# Patient Record
Sex: FEMALE | Race: WHITE | NOT HISPANIC OR LATINO | Employment: FULL TIME | URBAN - METROPOLITAN AREA
[De-identification: names, ages, dates, MRNs, and addresses within clinical notes are randomized per-mention and may not be internally consistent; named-entity substitution may affect disease eponyms.]

---

## 2023-04-28 ENCOUNTER — TRANSCRIBE ORDERS (OUTPATIENT)
Dept: ADMINISTRATIVE | Facility: HOSPITAL | Age: 43
End: 2023-04-28
Payer: COMMERCIAL

## 2023-04-28 DIAGNOSIS — G89.29 CHRONIC PAIN OF RIGHT KNEE: Primary | ICD-10-CM

## 2023-04-28 DIAGNOSIS — M25.561 CHRONIC PAIN OF RIGHT KNEE: Primary | ICD-10-CM

## 2023-04-28 DIAGNOSIS — M79.671 PAIN OF RIGHT HEEL: ICD-10-CM

## 2023-05-25 ENCOUNTER — OFFICE VISIT (OUTPATIENT)
Dept: ORTHOPEDIC SURGERY | Facility: CLINIC | Age: 43
End: 2023-05-25
Payer: COMMERCIAL

## 2023-05-25 ENCOUNTER — PATIENT ROUNDING (BHMG ONLY) (OUTPATIENT)
Dept: ORTHOPEDIC SURGERY | Facility: CLINIC | Age: 43
End: 2023-05-25
Payer: COMMERCIAL

## 2023-05-25 VITALS
SYSTOLIC BLOOD PRESSURE: 132 MMHG | BODY MASS INDEX: 42.22 KG/M2 | DIASTOLIC BLOOD PRESSURE: 86 MMHG | HEIGHT: 67 IN | WEIGHT: 269 LBS

## 2023-05-25 DIAGNOSIS — M77.51 RETROCALCANEAL BURSITIS (BACK OF HEEL), RIGHT: ICD-10-CM

## 2023-05-25 DIAGNOSIS — M76.61 ACHILLES TENDINITIS OF RIGHT LOWER EXTREMITY: Primary | ICD-10-CM

## 2023-05-25 RX ORDER — ERGOCALCIFEROL 1.25 MG/1
CAPSULE ORAL
COMMUNITY
Start: 2023-05-05

## 2023-05-25 NOTE — PROGRESS NOTES
May 25, 2023    Hello, may I speak with Nargis Weir?    My name is Angella      I am  with [facility]  [address]  [place] [address].    Before we get started may I verify your date of birth? 1980    I am calling to officially welcome you to our practice and ask about your recent visit. Is this a good time to talk? No.  Patient was at work when I called her.  Welcomed her to our practice!      Thank you, and have a great day.

## 2023-05-25 NOTE — PROGRESS NOTES
[facility] Office Visit     Office Visit       Date: 05/25/2023   Patient Name: Nargis Weir  MRN: [id number]  YOB: 1980    Referring Physician: Janell Campos AP*     Chief Complaint:   Chief Complaint   Patient presents with   • Right Foot - Pain       Nargis Weir is a 43 y.o. female who presents with new problem of: right foot pain.  Onset: atraumatic and gradual in nature. The issue has been ongoing for 1 year(s). Pain is a 3/10 on the pain scale. Pain is described as aching. Associated symptoms include pain. The pain is worse with walking and climbing stairs; sitting improve the pain. Previous treatments have included: NSAIDS.    I have reviewed the following portions of the patient's history:History of Present Illness and review of systems.            Subjective   Review of Systems: Review of Systems   Constitutional: Negative for chills, fever, unexpected weight gain and unexpected weight loss.   HENT: Negative for congestion, postnasal drip and rhinorrhea.    Eyes: Negative for blurred vision.   Respiratory: Negative for shortness of breath.    Cardiovascular: Negative for leg swelling.   Gastrointestinal: Negative for abdominal pain, nausea and vomiting.   Genitourinary: Negative for difficulty urinating.   Musculoskeletal: Positive for arthralgias. Negative for gait problem, joint swelling and myalgias.   Skin: Negative for skin lesions and wound.   Neurological: Negative for dizziness, weakness, light-headedness and numbness.   Hematological: Does not bruise/bleed easily.   Psychiatric/Behavioral: Negative for depressed mood.        Past Medical History:   Past Medical History:   Diagnosis Date   • Hip arthrosis 2016       Past Surgical History: History reviewed. No pertinent surgical history.    Family History:   Family History   Problem Relation Age of Onset   • Cancer Mother         breast cancer   • Osteoporosis Paternal  "Grandmother        Social History:   Social History     Socioeconomic History   • Marital status:    Tobacco Use   • Smoking status: Former     Packs/day: 0.25     Years: 15.00     Pack years: 3.75     Types: Cigarettes     Start date: 1994     Quit date: 2022     Years since quittin.0   • Tobacco comments:     Quit for a period of several on one occasion and for a year on another occasion   Substance and Sexual Activity   • Alcohol use: Yes     Comment: I drink socially once every few months or so   • Drug use: Never   • Sexual activity: Yes     Partners: Male     Birth control/protection: Vasectomy     Comment:  had a vasectomy in        Medications:   Current Outpatient Medications:   •  Diclofenac Sodium (VOLTAREN) 1 % gel gel, , Disp: , Rfl:   •  vitamin D (ERGOCALCIFEROL) 1.25 MG (03267 UT) capsule capsule, , Disp: , Rfl:     Allergies: No Known Allergies    I reviewed the patient's chief complaint, history of present illness, review of systems, past medical history, surgical history, family history, social history, medications and allergy list.     Objective    Vital Signs:   Vitals:    23 0949   BP: 132/86   Weight: 122 kg (269 lb)   Height: 170.3 cm (67.05\")     Body mass index is 42.07 kg/m². Class 3 Severe Obesity (BMI >=40). Obesity-related health conditions include the following: hypertension, coronary heart disease, diabetes mellitus and dyslipidemias. Obesity is newly identified. BMI is is above average; BMI management plan is completed. We discussed portion control and increasing exercise.  .  Patient reports that she is a former smoker. She quit smoking in .  She has not resumed smoking since that time.  This behavior was applauded and she was encouraged to continue in smoking cessation.  We will continue to monitor at subsequent visits.     Ortho Exam:  Constitutional: General Appearance: healthy-appearing, NAD, normal body habitus, and overweight. "   Psychiatric: Orientation: oriented to time, place, and person. Mood and Affect: normal mood and affect and active and alert.   Cardiovascular System: Arterial Pulses Right: dorsalis pedis normal. Arterial Pulses Left: dorsalis pedis normal. Varicosities Right: capillary refill test normal. Varicosities Left: capillary refill test normal.   Gait and Station: Appearance: ambulating with no assistive devices and antalgic gait.   Ankles and Feet: Inspection Right: no erythema, induration, warmth, or deformity and normal alignment and swelling. Bony Palpation of the Ankle/Foot Right: no tenderness of the ankle and tenderness of the achilles tendon insertion. Soft Tissue Palpation of the Ankle/Foot Right: tenderness of the achilles tendon. Active Range of Motion Right: dorsiflexion (10 deg.). Stability Right: anterior drawer negative and talar tilt negative. Strength Right: extensor digitorum longus (5/5) and brevis (5/5); extensor hallucis longus (5/5); peroneus longus (5/5) and brevis (5/5); and posterior tibialis (5/5), tibialis anterior (5/5), and gastrocnemius (5/5).   Neurological System: Sensation on the Right: normal distal extremities. Sensation on the Left: normal distal extremities.   Skin: Right Lower Extremity: normal. Left Lower Extremity: normal.    Results Review:   Imaging Results (Last 24 Hours)     ** No results found for the last 24 hours. **      I personally viewed the radiographs of the right foot and right calcaneus obtained on 5/4/2023.  Results show enthesophytes at both the plantar and Achilles insertion onto the calcaneus.  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    Procedures    Assessment / Plan    Assessment/Plan:   Diagnoses and all orders for this visit:    1. Achilles tendinitis of right lower extremity (Primary)  -     Ambulatory Referral to Physical Therapy Evaluate and treat, Ortho; Electrotherapy; Iontophoresis, E-stim; Desensitization, Soft Tissue Mobilizaton; Stretching,  ROM, Strengthening; Right; Full weight bearing    2. Retrocalcaneal bursitis (back of heel), right      Almost 1 year of worsening right heel pain that is causing a limp.  She had no mechanism of injury but was in a prolonged car ride and upon standing afterwards, felt sharp pain in her heel.  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  She is acutely tender on exam as well as swelling from retrocalcaneal bursitis.  She has tried anti-inflammatories without much success.  She has to wear shoes that do not cause pressure into her heel.  Her first objective is to reduce her limp.  Therefore, we will offload and walking boot.  We must make sure that it does not cause pressure and pain to the bursa and calcaneus.  I would recommend that she continue taking ibuprofen.  She may warrant a round of oral steroids in the future but I would hold off for now.  We will get her into physical therapy to hopefully reduce her pain as well as get her moving towards being in a regular shoe.  I will see her back in 6 weeks to monitor response.  Depending on her response over the next several months, she may warrant foot and ankle specialist referral.    Previous imaging studies reviewed: 5/4/2023-radiographs of the right foot and radiographs of the right calcaneus.    Previous laboratory results reviewed: 5/3/2023-creatinine 0.69, EGFR greater than 60.    Follow Up:   Return in about 6 weeks (around 7/6/2023) for Recheck.      Chance Anthony MD  [facility]

## 2023-06-05 ENCOUNTER — TREATMENT (OUTPATIENT)
Dept: PHYSICAL THERAPY | Facility: CLINIC | Age: 43
End: 2023-06-05
Payer: COMMERCIAL

## 2023-06-05 DIAGNOSIS — M76.61 TENDONITIS, ACHILLES, RIGHT: Primary | ICD-10-CM

## 2023-06-05 PROCEDURE — 97161 PT EVAL LOW COMPLEX 20 MIN: CPT | Performed by: PHYSICAL THERAPIST

## 2023-06-05 PROCEDURE — 97035 APP MDLTY 1+ULTRASOUND EA 15: CPT | Performed by: PHYSICAL THERAPIST

## 2023-06-05 PROCEDURE — 97110 THERAPEUTIC EXERCISES: CPT | Performed by: PHYSICAL THERAPIST

## 2023-06-05 NOTE — PROGRESS NOTES
Physical Therapy Initial Evaluation and Plan of Care      Patient: Nargis Weir   : 1980  Diagnosis/ICD-10 Code:  Tendonitis, Achilles, right [M76.61]  Referring practitioner: Corbin Anthony MD    Subjective Evaluation    History of Present Illness  Mechanism of injury: One year ago had a long car ride and started having achilles pain. Has had this before and it went away after 6 weeks. This has worsened over time. Pain starts at back of heel and radiates around heel.     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     Additional Ankle/Foot Observation Details  Edema at achilles insertion    Palpation     Right   Hypertonic in the medial gastrocnemius, peroneus and soleus. Tenderness of the soleus.     Tenderness     Right Ankle/Foot   Tenderness in the Achilles insertion. No tenderness in the plantar fascia.     Active Range of Motion   Left Ankle/Foot   Dorsiflexion (ke): 3 degrees   Dorsiflexion (kf): 5 degrees   Plantar flexion: 25 degrees   Inversion: 40 degrees   Eversion: 25 degrees     Right Ankle/Foot   Dorsiflexion (ke): 0 degrees   Dorsiflexion (kf): 0 degrees   Plantar flexion: 38 degrees   Inversion: 25 degrees   Eversion: 25 degrees     Joint Play     Additional Joint Play Details  Equal bilaterally     Strength/Myotome Testing     Right Ankle/Foot   Dorsiflexion: 4+  Plantar flexion:  4  Inversion: 4+  Eversion: 4+    Ambulation     Quality of Movement During Gait     Additional Quality of Movement During Gait Details  Early heel rise         Assessment & Plan     Assessment  Impairments: abnormal coordination, abnormal gait, abnormal muscle firing, abnormal muscle tone, abnormal or restricted ROM, activity intolerance, impaired balance, impaired physical strength, lacks appropriate home exercise program, pain with function and weight-bearing intolerance  Functional Limitations: walking, uncomfortable because of pain and standing  Assessment details: Patient is a 43 year old female presenting with chronic right posterior ankle pain she thinks began with a large car ride where she spent a long time with ankles crossed. Imaging demonstrates large bone spur at achilles insertion. Tenderness and pain is localized at achilles insertion site. She is now starting to have right knee pain from being in boot for 2 weeks. 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   Prognosis: good  Prognosis details: Short Term Goals (4 weeks):  1. Patient will be independent with home exercise program.  2. Patient will demonstrate improved ankle dorsiflexion to at least 5 degrees past neutral.   3. Patient will demonstrate improved eccentric ankle strength to perform heel raise.     Long Term Goals (12 weeks):  1. Patient will be able to walk at least 20 minutes with ankle pain no greater than 2/10.  2. Patient will demonstrate single leg balance for at least 20 seconds with ankle pain no greater than 2/10.  3. Patient will be able to return to full work/home duty with ankle pain no greater than 2/10.      Plan  Therapy options: will be seen for skilled therapy services  Planned modality interventions: ultrasound, thermotherapy (paraffin bath), thermotherapy  (hydrocollator packs), high voltage pulsed current (spasm management), high voltage pulsed current (pain management) and cryotherapy  Planned therapy interventions: therapeutic activities, stretching, strengthening, spinal/joint mobilization, soft tissue mobilization, postural training, neuromuscular re-education, motor coordination training, manual therapy, abdominal trunk stabilization, ADL retraining, balance/weight-bearing training, joint mobilization, IADL retraining, home exercise program, gait training, functional ROM exercises, flexibility, fine motor coordination training and body mechanics training  Frequency: 2x week  Duration in weeks: 12  Treatment plan discussed with: patient      Access Code: [email or web address]  URL: [email or web address]  Date: 06/05/2023  Prepared by: Rena Duran    Exercises  - Long Sitting Calf Stretch with Strap  - 4-5 x daily - 7 x weekly - 3-4 reps - 30 hold  - Long Sitting Soleus Stretch on Bolster with Strap  - 4-5 x daily - 7 x weekly - 3-4 reps - 30 hold  - Soleus Stretch on Wall  - 4-5 x daily - 7 x weekly - 3-4 reps - 30 hold  - Seated Calf Stretch with Strap  - 4-5 x daily - 7 x weekly - 3-4 reps - 30 hold  - Standing Eccentric Heel Raise  - 2-3 x daily - 7 x weekly - 2-3 sets - 10 reps    Patient Education  - Ice Massage    Manual Therapy:         mins  31738;  Therapeutic Exercise:    18     mins  72002;     Neuromuscular Gregorio:        mins  06833;    Therapeutic Activity:          mins  58591;     Gait Training:           mins  54208;     Ultrasound:     13     mins  77060;    Electrical Stimulation:         mins  36196 ( );  Dry Needling          mins self-pay    Timed Treatment:   31   mins   Total Treatment:     55   mins    PT SIGNATURE: Rena Duran, PT   DATE TREATMENT INITIATED: 6/6/2023    Initial Certification  Certification Period: 9/4/2023  I certify that the therapy services are furnished while this patient is under my care.  The services outlined  above are required by this patient, and will be reviewed every 90 days.     PHYSICIAN: Corbin Anthony MD      DATE:     Please sign and return via fax to [phone].. Thank you, [facility].

## 2023-06-07 ENCOUNTER — TREATMENT (OUTPATIENT)
Dept: PHYSICAL THERAPY | Facility: CLINIC | Age: 43
End: 2023-06-07
Payer: COMMERCIAL

## 2023-06-07 DIAGNOSIS — M76.61 TENDONITIS, ACHILLES, RIGHT: Primary | ICD-10-CM

## 2023-06-07 PROCEDURE — 97035 APP MDLTY 1+ULTRASOUND EA 15: CPT | Performed by: PHYSICAL THERAPIST

## 2023-06-07 PROCEDURE — 97140 MANUAL THERAPY 1/> REGIONS: CPT | Performed by: PHYSICAL THERAPIST

## 2023-06-07 PROCEDURE — 97110 THERAPEUTIC EXERCISES: CPT | Performed by: PHYSICAL THERAPIST

## 2023-06-07 NOTE — PROGRESS NOTES
Physical Therapy Daily Treatment Note         [address]              [place] [address]    Patient: Nargis Weir   : 1980  Diagnosis/ICD-10 Code:  Tendonitis, Achilles, right [M76.61]  Referring practitioner: Corbin Anthony MD  Date of Initial Visit: Type: THERAPY  Noted: 2023  Today's Date: 2023  Patient seen for 2 sessions         Nargis Weir reports: has to take heel raises very slow but improve ability to do them with practice.         Objective   See Exercise, Manual, and Modality Logs for complete treatment.       Assessment/Plan  Trial with US on posterior tendon insertion site as well as taping and ice massage. Also added foot strength and stability exercise.   Progress per Plan of Care           Manual Therapy:    15     mins  58992;  Therapeutic Exercise:    25     mins  77626;     Neuromuscular Gregorio:        mins  09279;    Therapeutic Activity:          mins  48225;     Gait Training:           mins  92458;     Ultrasound:     13     mins  47139;    Electrical Stimulation:         mins  56341 ( );  Dry Needling          mins self-pay    Timed Treatment:   53   mins   Total Treatment:     53   mins    Rena Duran PT  Physical Therapist

## 2023-06-13 ENCOUNTER — TREATMENT (OUTPATIENT)
Dept: PHYSICAL THERAPY | Facility: CLINIC | Age: 43
End: 2023-06-13
Payer: COMMERCIAL

## 2023-06-13 DIAGNOSIS — M76.61 TENDONITIS, ACHILLES, RIGHT: Primary | ICD-10-CM

## 2023-06-13 PROCEDURE — 97140 MANUAL THERAPY 1/> REGIONS: CPT | Performed by: PHYSICAL THERAPIST

## 2023-06-13 PROCEDURE — 97035 APP MDLTY 1+ULTRASOUND EA 15: CPT | Performed by: PHYSICAL THERAPIST

## 2023-06-13 PROCEDURE — 97110 THERAPEUTIC EXERCISES: CPT | Performed by: PHYSICAL THERAPIST

## 2023-06-13 NOTE — PROGRESS NOTES
Physical Therapy Daily Treatment Note         [address]              [place] [address]    Patient: Nargis Weir   : 1980  Diagnosis/ICD-10 Code:  Tendonitis, Achilles, right [M76.61]  Referring practitioner: Corbin Anthony MD  Date of Initial Visit: Type: THERAPY  Noted: 2023  Today's Date: 2023  Patient seen for 3 sessions         Nargis Weir reports: can take heel lower to ground during heel raise exercise. Did a lot of walking saturday at the fair without boot which did aggravate heel pain.         Objective   See Exercise, Manual, and Modality Logs for complete treatment.       Assessment/Plan  Compliance is important with tendonitis, discussed soft brace that may be better than nothing if she is going to come out of boot in some situations.   Progress per Plan of Care           Manual Therapy:    15     mins  57892;  Therapeutic Exercise:    25     mins  00539;     Neuromuscular Gregorio:        mins  89782;    Therapeutic Activity:          mins  96111;     Gait Training:          mins  94967;     Ultrasound:     13     mins  36604;    Electrical Stimulation:         mins  89123 ( );  Dry Needling          mins self-pay    Timed Treatment:   53   mins   Total Treatment:     53   mins    Rena Duran PT  Physical Therapist

## 2023-06-15 ENCOUNTER — TREATMENT (OUTPATIENT)
Dept: PHYSICAL THERAPY | Facility: CLINIC | Age: 43
End: 2023-06-15
Payer: COMMERCIAL

## 2023-06-15 DIAGNOSIS — M76.61 TENDONITIS, ACHILLES, RIGHT: Primary | ICD-10-CM

## 2023-06-15 PROCEDURE — 97140 MANUAL THERAPY 1/> REGIONS: CPT | Performed by: PHYSICAL THERAPIST

## 2023-06-15 PROCEDURE — 97035 APP MDLTY 1+ULTRASOUND EA 15: CPT | Performed by: PHYSICAL THERAPIST

## 2023-06-15 PROCEDURE — 97110 THERAPEUTIC EXERCISES: CPT | Performed by: PHYSICAL THERAPIST

## 2023-06-15 NOTE — PROGRESS NOTES
Physical Therapy Daily Treatment Note         [address]              [place] [address]    Patient: Nargis Weir   : 1980  Diagnosis/ICD-10 Code:  Tendonitis, Achilles, right [M76.61]  Referring practitioner: Corbin Anthony MD  Date of Initial Visit: Type: THERAPY  Noted: 2023  Today's Date: 6/15/2023  Patient seen for 4 sessions         Nargis Weir reports: no change from Tuesday that she can tell. Walking in today she started having a little sharp pain on lateral-dorsal foot.         Objective   See Exercise, Manual, and Modality Logs for complete treatment.       Assessment/Plan  Able to progress exercises without elevating pain. Trial with ionto with dexamethasone.   Progress per Plan of Care and Progress strengthening /stabilization /functional activity           Manual Therapy:    10     mins  21273;  Therapeutic Exercise:    30     mins  55599;     Neuromuscular Gregorio:        mins  62155;    Therapeutic Activity:          mins  57579;     Gait Training:           mins  34974;     Ultrasound:     13     mins  85450;    Electrical Stimulation:         mins  37202 ( );  Dry Needling          mins self-pay    Timed Treatment:   53   mins   Total Treatment:     53   mins    Rena Duran PT  Physical Therapist

## 2023-07-20 ENCOUNTER — TREATMENT (OUTPATIENT)
Dept: PHYSICAL THERAPY | Facility: CLINIC | Age: 43
End: 2023-07-20
Payer: COMMERCIAL

## 2023-07-20 DIAGNOSIS — M76.61 TENDONITIS, ACHILLES, RIGHT: Primary | ICD-10-CM

## 2023-07-20 PROCEDURE — 97110 THERAPEUTIC EXERCISES: CPT | Performed by: PHYSICAL THERAPIST

## 2023-07-20 PROCEDURE — 97530 THERAPEUTIC ACTIVITIES: CPT | Performed by: PHYSICAL THERAPIST

## 2023-07-20 NOTE — PROGRESS NOTES
Physical Therapy Progress Note         [address]              [place] [address]    Patient: Nargis Weir   : 1980  Diagnosis/ICD-10 Code:  Tendonitis, Achilles, right [M76.61]  Referring practitioner: Corbin Anthony MD  Date of Initial Visit: Type: THERAPY  Noted: 2023  Today's Date: 2023  Patient seen for 10 sessions         Nargis Weir reports: still having a lot of trigger points and tightness in calf.     Subjective Questionnaire: LEFS: 58    Objective   Palpation: multiple trigger points along gastroc/soleus. Tenderness at achilles insertion.    Ankle AROM:  DF: 3 deg   Inversion: 30 deg     SLS: 30 sec b/l on even surface    Strength MMT: grossly 5/5 all directions.     See Exercise, Manual, and Modality Logs for complete treatment.       Assessment/Plan  Trial with dry needling in calf by certified therapist (Radhames Sibley). Will assess next visit for effectiveness. Strenth and stability improving well. Still dealing with tightness in calf which is likely causing issues at achilles insertion. Patient is still appropriate for physical therapy.     Progress toward previous goals: Partially Met    Short Term Goals (4 weeks):  1. Patient will be independent with home exercise program.  MET   2. Patient will demonstrate improved ankle dorsiflexion to at least 5 degrees past neutral.  Progressing.   3. Patient will demonstrate improved eccentric ankle strength to perform heel raise. MET      Long Term Goals (12 weeks):  1. Patient will be able to walk at least 20 minutes with ankle pain no greater than 2/10. Progressing   2. Patient will demonstrate single leg balance for at least 20 seconds with ankle pain no greater than 2/10.  MET   3. Patient will be able to return to full work/home duty with ankle pain no greater than 2/10.  Progressing     Progress per Plan of Care  2x/week for 4 weeks          Manual Therapy:         mins  21362;  Therapeutic Exercise:    35      mins  91556;     Neuromuscular Gregorio:        mins  38718;    Therapeutic Activity:     23     mins  60915;     Gait Training:           mins  13259;     Ultrasound:          mins  79527;    Electrical Stimulation:         mins  56864 ( );  Dry Needling          mins self-pay    Timed Treatment:   58   mins   Total Treatment:     58   mins    Rena Duran, PT  Physical Therapist

## 2023-07-25 ENCOUNTER — TELEPHONE (OUTPATIENT)
Dept: PHYSICAL THERAPY | Facility: OTHER | Age: 43
End: 2023-07-25
Payer: COMMERCIAL

## 2023-07-25 NOTE — TELEPHONE ENCOUNTER
Caller: Nargis Weir    Relationship: Self    What was the call regarding: PATIENT CANCELLED APPT TODAY BECAUSE THEY CANT MAKE IT

## 2023-07-27 ENCOUNTER — TREATMENT (OUTPATIENT)
Dept: PHYSICAL THERAPY | Facility: CLINIC | Age: 43
End: 2023-07-27
Payer: COMMERCIAL

## 2023-07-27 DIAGNOSIS — M76.61 TENDONITIS, ACHILLES, RIGHT: Primary | ICD-10-CM

## 2023-07-27 PROCEDURE — 97110 THERAPEUTIC EXERCISES: CPT | Performed by: PHYSICAL THERAPIST

## 2023-07-27 PROCEDURE — 97035 APP MDLTY 1+ULTRASOUND EA 15: CPT | Performed by: PHYSICAL THERAPIST

## 2023-07-27 PROCEDURE — 97140 MANUAL THERAPY 1/> REGIONS: CPT | Performed by: PHYSICAL THERAPIST

## 2023-08-08 ENCOUNTER — TREATMENT (OUTPATIENT)
Dept: PHYSICAL THERAPY | Facility: CLINIC | Age: 43
End: 2023-08-08
Payer: COMMERCIAL

## 2023-08-08 DIAGNOSIS — M76.61 TENDONITIS, ACHILLES, RIGHT: Primary | ICD-10-CM

## 2023-08-08 PROCEDURE — 97110 THERAPEUTIC EXERCISES: CPT | Performed by: PHYSICAL THERAPIST

## 2023-08-08 PROCEDURE — 97035 APP MDLTY 1+ULTRASOUND EA 15: CPT | Performed by: PHYSICAL THERAPIST

## 2023-08-08 NOTE — LETTER
Physical Therapy Progress Note         [address]              [place] [address]    Patient: Nargis Weir   : 1980  Diagnosis/ICD-10 Code:  Tendonitis, Achilles, right [M76.61]  Referring practitioner: Corbin Anthony MD  Date of Initial Visit: Type: THERAPY  Noted: 2023  Today's Date: 2023  Patient seen for 12 sessions         Nargis Weir reports: since initial eval the intensity of pain has lessened and she can walk better but still having the pain at the back of heel and to the outside.     Objective      Active Range of Motion   Left Ankle/Foot   Dorsiflexion (ke): 5 degrees     Right Ankle/Foot   Dorsiflexion (ke): 5 degrees   Plantar flexion: WFL  Inversion: WFL  Eversion: WFL      Tenderness at achilles insertion and just lateral.    Small pocket of edema lateral and superior to achilles insertion.     Crepitus in knees with squat but does not worsen ankle symptoms.     SLS: 30 sec b/l       See Exercise, Manual, and Modality Logs for complete treatment.       Assessment/Plan  Had more pain today with heel raise negative. Issue does not present as tendonitis but likely directly due to large bone spur at achilles insertion. Possible retrocalcaneal bursitis. She has made improvements overall but has recently plateaued despite improvement in strength and ankle/achilles mobility. We have tried gentle progressive strengthening, eccentric strengthening, dry needling and other muscle release techniques, and modalities. We have also worked some on knee strength and stability.     Progress toward previous goals: Partially Met    Short Term Goals (4 weeks):  1. Patient will be independent with home exercise program.  MET   2. Patient will demonstrate improved ankle dorsiflexion to at least 5 degrees past neutral. MET   3. Patient will demonstrate improved eccentric ankle strength to perform heel raise. MET      Long Term Goals (12 weeks):  1. Patient will be able to walk at  least 20 minutes with ankle pain no greater than 2/10.  Not yet met  2. Patient will demonstrate single leg balance for at least 20 seconds with ankle pain no greater than 2/10. MET   3. Patient will be able to return to full work/home duty with ankle pain no greater than 2/10.  Not yet met     Awaiting MD christie Duran, PT  Physical Therapist

## 2023-08-08 NOTE — PROGRESS NOTES
Physical Therapy Progress Note         [address] [facility] [address]              [place] [address]    Patient: Nargis Weir   : 1980  Diagnosis/ICD-10 Code:  Tendonitis, Achilles, right [M76.61]  Referring practitioner: Corbin Anthony MD  Date of Initial Visit: Type: THERAPY  Noted: 2023  Today's Date: 2023  Patient seen for 12 sessions         Nargis Weir reports: since initial eval the intensity of pain has lessened and she can walk better but still having the pain at the back of heel and to the outside.       Objective          Active Range of Motion   Left Ankle/Foot   Dorsiflexion (ke): 5 degrees     Right Ankle/Foot   Dorsiflexion (ke): 5 degrees   Plantar flexion: WFL  Inversion: WFL  Eversion: WFL      Tenderness at achilles insertion and just lateral.    Small pocket of edema lateral and superior to achilles insertion.     Crepitus in knees with squat but does not worsen ankle symptoms.     SLS: 30 sec b/l       See Exercise, Manual, and Modality Logs for complete treatment.       Assessment/Plan  Had more pain today with heel raise negative. Issue does not present as tendonitis but likely directly due to large bone spur at achilles insertion. Possible retrocalcaneal bursitis. She has made improvements overall but has recently plateaued despite improvement in strength and ankle/achilles mobility. We have tried gentle progressive strengthening, eccentric strengthening, dry needling and other muscle release techniques, and modalities. We have also worked some on knee strength and stability.     Progress toward previous goals: Partially Met    Short Term Goals (4 weeks):  1. Patient will be independent with home exercise program.  MET   2. Patient will demonstrate improved ankle dorsiflexion to at least 5 degrees past neutral. MET   3. Patient will demonstrate improved eccentric ankle strength to perform heel raise. MET      Long Term Goals (12 weeks):  1. Patient will be able to  walk at least 20 minutes with ankle pain no greater than 2/10.  Not yet met  2. Patient will demonstrate single leg balance for at least 20 seconds with ankle pain no greater than 2/10. MET   3. Patient will be able to return to full work/home duty with ankle pain no greater than 2/10.  Not yet met     Awaiting MD orders           Manual Therapy:         mins  75424;  Therapeutic Exercise:    38     mins  16070;     Neuromuscular Gregorio:        mins  46668;    Therapeutic Activity:          mins  81160;     Gait Training:           mins  17810;     Ultrasound:     13     mins  69599;    Electrical Stimulation:         mins  54522 ( );  Dry Needling          mins self-pay    Timed Treatment:    51  mins   Total Treatment:     51   mins    Rena Duran PT  Physical Therapist

## 2023-08-17 ENCOUNTER — OFFICE VISIT (OUTPATIENT)
Dept: ORTHOPEDIC SURGERY | Facility: CLINIC | Age: 43
End: 2023-08-17
Payer: COMMERCIAL

## 2023-08-17 VITALS
HEIGHT: 68 IN | BODY MASS INDEX: 40.71 KG/M2 | SYSTOLIC BLOOD PRESSURE: 150 MMHG | WEIGHT: 268.6 LBS | DIASTOLIC BLOOD PRESSURE: 94 MMHG

## 2023-08-17 DIAGNOSIS — M76.61 ACHILLES TENDINITIS OF RIGHT LOWER EXTREMITY: Primary | ICD-10-CM

## 2023-08-17 NOTE — PROGRESS NOTES
[facility] Office Follow Up Visit     Office Follow Up      Date: 08/17/2023   Patient Name: Nargis Weir  MRN: [id number]  YOB: 1980    Referring Physician: No ref. provider found     Chief Complaint:   Chief Complaint   Patient presents with    Follow-up     6 week f/u Achilles tendinitis of right lower extremity, Retrocalcaneal bursitis- Right     History of Present Illness: Nargis Weir is a 43 y.o. female who is here today for follow up on right Achilles tendinitis.  Pain is rated 2/10.  She has been in physical therapy as well as booting. Her therapy has plateaued in improvement.  She has been taking anti-inflammatory medication as needed.    Subjective   Review of Systems: Review of Systems   Constitutional:  Negative for chills, fever, unexpected weight gain and unexpected weight loss.   HENT:  Negative for congestion, postnasal drip and rhinorrhea.    Eyes:  Negative for blurred vision.   Respiratory:  Negative for shortness of breath.    Cardiovascular:  Negative for leg swelling.   Gastrointestinal:  Negative for abdominal pain, nausea and vomiting.   Genitourinary:  Negative for difficulty urinating.   Musculoskeletal:  Positive for arthralgias. Negative for gait problem, joint swelling and myalgias.   Skin:  Negative for skin lesions and wound.   Neurological:  Negative for dizziness, weakness, light-headedness and numbness.   Hematological:  Does not bruise/bleed easily.   Psychiatric/Behavioral:  Negative for depressed mood.       Medications:   Current Outpatient Medications:     Diclofenac Sodium (VOLTAREN) 1 % gel gel, , Disp: , Rfl:     vitamin D (ERGOCALCIFEROL) 1.25 MG (82271 UT) capsule capsule, , Disp: , Rfl:     Allergies: No Known Allergies    I have reviewed and updated the patient's chief complaint, history of present illness, review of systems, past medical history, surgical history, family history, social history,  "medications and allergy list as appropriate.     Objective    Vital Signs:   Vitals:    08/17/23 0854   BP: 150/94   Weight: 122 kg (268 lb 9.6 oz)   Height: 171.5 cm (67.5\")     Body mass index is 41.45 kg/mý. Class 3 Severe Obesity (BMI >=40). Obesity-related health conditions include the following: hypertension, coronary heart disease, diabetes mellitus and dyslipidemias. Obesity is newly identified. BMI is is above average; BMI management plan is completed. We discussed portion control and increasing exercise.  .  Patient reports that she is a former smoker. She quit smoking in 2022.  She has not resumed smoking since that time.  This behavior was applauded and she was encouraged to continue in smoking cessation.  We will continue to monitor at subsequent visits.    Ortho Exam:  Gait and Station: Appearance: ambulating with no assistive devices and antalgic gait.   Ankles and Feet: Inspection Right: no erythema, induration, warmth, or deformity and normal alignment and swelling. Bony Palpation of the Ankle/Foot Right: no tenderness of the ankle and tenderness of the achilles tendon insertion. Soft Tissue Palpation of the Ankle/Foot Right: tenderness of the achilles tendon. Active Range of Motion Right: dorsiflexion (10 deg.). Stability Right: anterior drawer negative and talar tilt negative. Strength Right: extensor digitorum longus (5/5) and brevis (5/5); extensor hallucis longus (5/5); peroneus longus (5/5) and brevis (5/5); and posterior tibialis (5/5), tibialis anterior (5/5), and gastrocnemius (5/5).    Results Review:   Imaging Results (Last 24 Hours)       ** No results found for the last 24 hours. **            Procedures    Assessment / Plan    Assessment/Plan:   Diagnoses and all orders for this visit:    1. Achilles tendinitis of right lower extremity (Primary)      Follow-up on right insertional Achilles tendinitis.  Symptoms have not significantly improved since last visit despite " anti-inflammatories and physical therapy.  She has transition to home exercise program.  Overall, she is improved from the initial outset of treatment.  She has been losing weight.  She has not been using her boot since last visit.  However, I recommended that she does a lot of walking that causes significant pain and limping that she use the boot temporarily to calm down her symptoms.  Continue with home exercises.  Continue with anti-inflammatories.  I will see her back in 6 weeks to monitor her response.  If she is unable to make improvements from there, I will likely refer her to our foot and ankle specialist.    Follow Up:   Return in about 6 weeks (around 9/28/2023) for Recheck.      Chance Anthony MD  [facility]

## 2023-09-28 ENCOUNTER — OFFICE VISIT (OUTPATIENT)
Age: 43
End: 2023-09-28
Payer: COMMERCIAL

## 2023-09-28 VITALS
WEIGHT: 268.4 LBS | BODY MASS INDEX: 43.13 KG/M2 | SYSTOLIC BLOOD PRESSURE: 144 MMHG | DIASTOLIC BLOOD PRESSURE: 92 MMHG | HEIGHT: 66 IN

## 2023-09-28 DIAGNOSIS — M76.61 ACHILLES TENDINITIS OF RIGHT LOWER EXTREMITY: Primary | ICD-10-CM

## 2023-09-28 NOTE — PROGRESS NOTES
[facility] Follow Up Visit     Office Follow Up      Date: 09/28/2023   Patient Name: Nargis Weir  MRN: [id number]  YOB: 1980    Referring Physician: No ref. provider found     Chief Complaint:   Chief Complaint   Patient presents with    Follow-up     6 week follow up -- Achilles tendinitis of right lower extremity     History of Present Illness: Nargis Weir is a 43 y.o. female who is here today for follow up on right foot pain from Achilles tendinitis.  Since last visit, she has been continuing her home exercise program.  She has not needed the walking boot.  She has been working on weight loss.  Her pain is still a 3-4/10.  She is still quite sensitive at the heel.  Overall, she is unchanged.    Subjective   Review of Systems: Review of Systems   Constitutional: Negative.  Negative for chills, fatigue and fever.   HENT: Negative.  Negative for congestion and dental problem.    Eyes: Negative.  Negative for blurred vision.   Respiratory: Negative.  Negative for shortness of breath.    Cardiovascular: Negative.  Negative for leg swelling.   Gastrointestinal: Negative.  Negative for abdominal pain.   Endocrine: Negative.  Negative for polyuria.   Genitourinary: Negative.  Negative for difficulty urinating.   Musculoskeletal:  Positive for arthralgias.   Skin: Negative.    Allergic/Immunologic: Negative.    Neurological: Negative.    Hematological: Negative.  Negative for adenopathy.   Psychiatric/Behavioral: Negative.  Negative for behavioral problems.       Medications: No current outpatient medications on file.    Allergies: No Known Allergies    I have reviewed and updated the patient's chief complaint, history of present illness, review of systems, past medical history, surgical history, family history, social history, medications and allergy list as appropriate.     Objective    Vital Signs:   Vitals:    09/28/23 0841   BP: 144/92  "  BP Location: Left arm   Patient Position: Sitting   Cuff Size: Adult   Weight: 122 kg (268 lb 6.4 oz)   Height: 167.6 cm (66\")     Body mass index is 43.32 kg/m².  Class 3 Severe Obesity (BMI >=40). Obesity-related health conditions include the following: hypertension, coronary heart disease, diabetes mellitus and dyslipidemias. Obesity is newly identified. BMI is is above average; BMI management plan is completed. We discussed portion control and increasing exercise.  .  Patient reports that she is a former smoker. She quit smoking in 2022.  She has not resumed smoking since that time.  This behavior was applauded and she was encouraged to continue in smoking cessation.  We will continue to monitor at subsequent visits.     Ortho Exam:  Gait and Station: Appearance: ambulating with no assistive devices and antalgic gait.   Ankles and Feet: Inspection Right: no erythema, induration, warmth, or deformity and normal alignment and swelling. Bony Palpation of the Ankle/Foot Right: no tenderness of the ankle and tenderness of the achilles tendon insertion. Soft Tissue Palpation of the Ankle/Foot Right: tenderness of the achilles tendon. Active Range of Motion Right: dorsiflexion (10 deg.). Stability Right: anterior drawer negative and talar tilt negative. Strength Right: extensor digitorum longus (5/5) and brevis (5/5); extensor hallucis longus (5/5); peroneus longus (5/5) and brevis (5/5); and posterior tibialis (5/5), tibialis anterior (5/5), and gastrocnemius (5/5).    Results Review:   Imaging Results (Last 24 Hours)       ** No results found for the last 24 hours. **            Procedures    Assessment / Plan    Assessment/Plan:   Diagnoses and all orders for this visit:    1. Achilles tendinitis of right lower extremity (Primary)      Follow-up on right Achilles tendinitis.  Symptoms have improved overall since the beginning of treatment with walking boot, physical therapy, activity modification, " anti-inflammatory medication, and attempts at weight loss.  However, improvement has stalled.  She is still quite sensitive and has daily consistent pain especially with ambulation.  Overall, she is not content with her improvement.  Therefore, I will arrange for referral to see Dr. Hoyos, foot and ankle specialist in follow-up.  She should continue with the aforementioned medications and modalities in the interim.  She will follow with me on an as-needed basis.    Follow Up:   Return for F/U with Soha.      Chance Anthony MD  [facility]

## 2023-10-04 ENCOUNTER — OFFICE VISIT (OUTPATIENT)
Dept: ORTHOPEDIC SURGERY | Facility: CLINIC | Age: 43
End: 2023-10-04
Payer: COMMERCIAL

## 2023-10-04 VITALS — BODY MASS INDEX: 42.38 KG/M2 | HEIGHT: 67 IN | WEIGHT: 270 LBS

## 2023-10-04 DIAGNOSIS — M72.2 PLANTAR FASCIITIS: ICD-10-CM

## 2023-10-04 DIAGNOSIS — M76.60 INSERTIONAL ACHILLES TENDINOPATHY: Primary | ICD-10-CM

## 2023-10-04 NOTE — PATIENT INSTRUCTIONS
Insertional Achilles Tendinopathy (IAT)    What is insertional Achilles tendinopathy?  The Achilles tendon is a large tendon that runs along the backside of the ankle  The Achilles tendon connects the powerful calf muscles (gastrocnemius and soleus) to the heel bone (calcaneus)  When the calf muscles contract, the Achilles tendon pulls and elevates the back of the heel bone. This causes ankle plantarflexion and generates push-off strength for walking, running and jumping.  Insertional Achilles tendinopathy (IAT) is a more general term given to a group of conditions that involve the very end of the Achilles tendon, at the site where the tendon inserts onto the calcaneus. These conditions lead to posterior heel pain (pain in the back of the heel).   Two of the most common conditions in this group are insertional Achilles tendinosis and Chad's syndrome. These two conditions frequently occur together to varying degrees.    Insertional Achilles tendinosis is considered a degenerative condition which means that there is “wear and tear” damage involving Achilles tendon fibers at the site of attachment onto the calcaneus. Sometimes the body will replace this diseased tendon with bone which can produce bone spurs on the back of the heel.   This condition more commonly affects middle-aged, more sedentary patients  Chad's syndrome is a condition caused by “impingement”. There is an area of bone on the upper part of the calcaneus (deeper than the Achilles tendon) that can become enlarged, creating a prominence. This occurs gradually over many years. This prominence is known as a “Chad's deformity”. In some patients, the Chad's deformity gets so big that it impinges on surrounding structures. One of those structures is the retrocalcaneal bursa which can become inflamed, irritated, and filled with fluid (retrocalcaneal bursitis).   This condition more commonly affects younger, more active patients   Putting this all  together…  Insertional Achilles tendinopathy (IAT) causes posterior heel pain  Some patients diagnosed with IAT may have more of a tendon disease problem (insertional Achilles tendinosis)  Other patients diagnosed with IAT may have more of an impingement problem related to a prominent Chad's deformity (Chad's syndrome)  But, more commonly, patients with IAT will have some degree of both conditions, happening simultaneously         Chad's deformity                                                                         Insertional tendinosis bone spur    What are the symptoms of insertional Achilles tendinopathy?  Pain, redness and skin irritation from rubbing on the back of the heel when wearing footwear with a heel counter (closed in the heel region)  Pain along the back of the heel that worsens with activity  Pain and stiffness along the end of the Achilles tendon, near the heel, in the morning or after other periods of rest or inactivity  Swelling along the back of the heel that is tender to the touch  Severe pain the day after exercising  Thickening of the end of the Achilles tendon, near the heel      Bone spur formation and the development of a prominence at the back of the heel    What causes insertional Achilles tendinopathy?  These conditions usually develop slowly over time and are not related to a specific injury  Insertional Achilles tendinosis is a degenerative condition involving the attachment site of the Achilles tendon onto the heel bone.    The term degenerative is used to describe conditions that are caused by repetitive “wear and tear” or “overuse”. Chronic repetitive stress to the tendon leads to the accumulation of injury at the tendon attachment site.   Symptoms may be brought on by a sudden increase in the amount or intensity of exercise activity  Tight calf muscles and poor flexibility may put extra strain on the Achilles tendon attachment site and could contribute to the  development of symptoms   The presence of a large Chad's prominence or Chad's deformity would place a patient at risk for impingement and irritation of the retrocalcaneal bursa. However, not all patients with a Chad's deformity develop pain in the back of the heel.   How is insertional Achilles tendinopathy diagnosed?  Insertional Achilles tendinopathy can usually be diagnosed based on history and physical examination  X-rays are performed routinely for these conditions and may show a prominent Chad's deformity or calcifications within the insertion of the Achilles tendon (bone spur) which would suggest insertional Achilles tendinosis  MRI is not necessary for diagnosis but may be used later for patients that are not improving with nonsurgical treatment to better guide a surgical plan      How is insertional Achilles tendinopathy treated?  Thankfully, many cases of insertional Achilles tendinopathy are treated successfully without surgery. And this is true whether a patient has more insertional Achilles tendinosis, more Chad's syndrome, or some of both.   The non-surgical treatment options are very similar for all forms of insertional Achilles tendinopathy  Because of the good rates of success with non-surgical treatment, most individuals are treated conservatively for 3-6 months before surgical intervention would be considered.   Most patients improve with a dedicated period of calf muscle and Achilles tendon stretching as well as a very specific form of calf muscle strengthening  It is important to know that recovery takes time and that the below non-surgical treatment strategies must be consistently applied for multiple weeks before symptoms improve.     Temporary immobilization:  Some patients present to clinic with a heel that is very swollen, acutely inflamed and exquisitely tender to the touch. It is challenging even for these patients to walk normally and they do so with a limp. These patients  often benefit from a period of strict rest, such as in a walking boot, to really relax the tendon attachment site.  This can help jumpstart the healing process and calm down the aggravated tissue. For these cases a boot or cast may be recommended until the severe symptoms have passed.  A boot is NOT the definitive treatment, however. This is only a temporary strategy to improve symptoms so that a patient can tolerate regular walking and eventually, therapy exercises.           Footwear modifications:  For a patient with insertional Achilles tendinopathy, walking barefoot, or in a flat-soled shoe, increases the tension on the insertion of the Achilles tendon and can worsen symptoms.     Avoiding footwear with a rigid heel counter may help by relieving external sources of compression and rubbing on the painful posterior heel area. Footwear that might accomplish this would include supportive clogs, crocs or recovery sandals. In general, [employer] makes supportive footwear with open heel regions. This is a good option. [employer] is another footwear brand that makes supportive sandals, clogs and slippers.     Activity modification:  Avoid activities that aggravate pain in the back of the heel  This seems so simple but it is often the most challenging part of the recovery. Many patients with insertional Achilles tendinopathy are active and want to be participating in sport and other recreational activities. In order to fully recover, a dedicated period of avoidance of these types of activities is necessary.   Common activities that need to be put on hold include: running, jumping, power walking and hiking. These are higher impact.   Some patients are able to continue biking, cycling or using an elliptical throughout their recovery from insertional Achilles tendinopathy without aggravation of symptoms. This is probably because these activities are low impact and put less strain on the Achilles tendon and its attachment  site.   The general rule is that if an activity causes pain at the back of the heel, then it should be avoided until it does not.   The tendon attachment site needs to heal before it can be retrained!  Returning to a desired higher impact activity too soon is a very common mistake in patients with insertional Achilles tendinopathy.     Medications:  Non-steroidal anti-inflammatory medications (NSAIDs) may be tried.   There are over-the-counter and prescription options. These are typically taken by mouth, but there are also topical formulations such as Voltaren (diclofenac) gel (available at any drugstore, ie. [employer], [employer]bag,etc.)   Remember, insertional Achilles tendinosis is a degenerative problem. It is not really an inflammatory condition and so the use of these medications in isolation will rarely solve the underlying problem. These medications may be helpful for their analgesic (pain-relieving) function while other, more effective strategies like stretching and strengthening, are employed.   There may be more of an inflammatory component to Chad's syndrome. This is a more of an impingement problem that can lead to inflammation within the retrocalcaneal bursa. This condition may be more responsive to anti-inflammatory medications.   Some patients have medical conditions that limit their ability to take NSAIDs.  Ice is okay for insertional Achilles tendinopathy    Night splinting:  Many patients share that pain is the worst in the mornings with the first few steps of the day. This is probably due to the way the ankle rests in plantarflexion (toes pointed down) during sleep. A plantarflexed ankle leads to a contracted or tight position for the calf and plantar fascia tissue. There is some evidence to suggest that keeping the ankle in a neutral (90-degree) position overnight can help alleviate some of the morning symptoms. This can be accomplished with a night splint. These can be searched for and purchased on  "[employer] for $20-40.     Go to Amazon.com and type in \"Plantar Fasciitis Night Splint\"    Calf muscle and Achilles tendon/heel cord stretching exercises:  Stretching is the most important part of the recovery program for insertional Achilles tendinopathy  Improving the flexibility of the calf muscle and Achilles tendon complex leads to a decrease in the amount of resting tension or strain experienced at the tendon attachment site  There are several stretching exercises that can be tried. In general, it is advisable to pick 2 or 3 of them to perform each day. It is okay to change things up and vary the types of stretches.  The important aspect of stretching is doing it consistently. One week will not be enough. Daily stretching for several months is more likely to lead to symptomatic relief and clinical improvement.   Patients can often perform these stretches by themselves as part of a home exercise program however a patient may benefit from formal guidance from a physical therapist in order to do these stretches consistently          Eccentric strengthening exercises (also known as Heel Drops):  This is an important part to recovery  Studies have shown that the Achilles tendon heals more appropriately when it is loaded eccentrically and this can help resolve symptoms in patients with insertional Achilles tendinopathy  The term “eccentric” means that the muscle is elongating (getting longer) while still annie  These exercises are known as heel drops and can be part of a home exercise program or can be guided by a physical therapist.   Do not confuse this exercise with calf raises or heel rises. These are also forms of calf strengthening exercises, but they have not been shown to help with Achilles tendon healing. Performing a bunch of calf raises, instead of controlled heel drops, may be counterproductive and could prolong symptoms.   See Details Below      Calf Stretches/Exercises:  YOU MUST DO:   10 " REPETITIONS          5-6 TIMES PER DAY            FOR 4 MONTHS    Heel drops  This option is a strengthening exercise in addition to a stretching exercise  Stand on the ball of your foot while positioned on the edge of a ledge, such as a stair   Slowly lower the heels down below the ledge  The lowering of the heels should be controlled and deliberate and should take about 10 seconds. This is the strengthening portion of this exercise.   When the heels have dropped completely, this position can be held longer to get a good stretch.   Heel drops can be done with both legs at the same time or one leg at a time for a more concentrated effort.   This can also be done with the knees straight (stretching the gastrocnemius) or with the knees bent (to stretch the soleus).         2)   Straight knee standing stretch  Stand facing a wall with your unaffected leg forward with a slight bend at the knee. Your affected leg behind you with the knee straight or extended.   The heels should be flat on the floor.   Press your hips forward toward the wall. Try not to arch your back.  You should feel a pulling or stretching sensation in the back of the leg along the calf.   Hold this stretch for 30 seconds and then relax for 30 seconds. This is one repetition.  You should perform 10 repetitions to make a set and you should perform 2-3 sets per day.   Because the knee is kept straight, this is a good stretch for the gastrocnemius muscle.         3)   Bent knee standing stretch  Stand facing a wall with your unaffected leg forward with a slight bend at the knee. Your affected leg is behind you with the knee bent or flexed.  The heels should be flat on the floor  Press your hips forward toward the wall and try not to arch your back.  Hold this stretch for 30 seconds and then relax for 30 seconds. This is one repetition.  You should perform 10 repetitions to make a set and you should perform 2-3 sets per day.   Because the knee is bent,  this is a good stretch for the soleus muscle.     2    4)   Seated towel stretch  Sit on the floor or in bed with both legs out in front of you.  Loop a towel or a belt around the ball of your affected foot and grasp the ends of the towel in your hands.  Keep your affected knee straight or extended and pull the towel toward you.  Hold for 30 seconds and then relax for 30 seconds. This is one repetition.        5)   Wall stretch  Stand about two feet away from a wall. Place the ball of your affected foot against the wall while the heel remains on the ground.   Slowly and gently lean into the wall with your hips while keeping your knee straight.  Hold this for about 30 seconds and then relax. This is one repetition.         6)   Downward dog yoga stretch  Get down on all fours with your hands positioned under your shoulders on the floor.  Walk your hands forward slightly on the floor.   Spread your fingers apart to allow for a broad base of support.   Push your hips up toward the ceiling and tighten your abdominal muscles.   Keep your heels on the ground and gently try to straighten your knees.  You should feel a pull or stretching sensation at the back of both legs along the calf muscles.  Hold this stretch for about 15-30 seconds and then relax. This is a repetition.   5    7)   Foam roller stretch  Sit on the floor with your legs stretched out in front of you.   Place the foam roller under the lower half of your affected leg.   Cross the other leg over the affected leg.   Push up with your arms so that your bottom is off the floor  Slowly roll yourself over the foam roller back and forth working your way up the calf muscle toward the knee. It is not necessary to roll across the back of the knee.   Try performing this with your toes pointing inward and outward to get a slightly different stretch.  Roll the affected side for about 30 seconds and then relax.       RESOURCES  Some of the above material was adapted from  the FootEducation website. For more information, please visit footcreditmontoring.com.com. Search “Chad syndrome”, “insertional Achilles tendonitis” or “posterior heel pain”.   Some of the above material, including images, was adapted from the American Academy of Orthopedic Surgery's patient education resource called OrthoInfo. Additional information can be found at www.orthoinfo.org. Search “achilles tendinitis”.   Rachell Cortes MD, MPH; Tien Santacruz MD; Durga Rashid MD; Otoniel Del Rio MD, FACS. Management of Insertional Achilles Tendinopathy. Journal of the American Academy of Orthopaedic Surgeons: May 15, 2022 - Volume 30 - Issue 10 - p w529-z857.

## 2023-10-04 NOTE — PROGRESS NOTES
[facility] [place] [facility] Office Visit     Office Visit       Date: 10/04/2023   Patient Name: Nargis Weir  MRN: [id number]  YOB: 1980    Referring Physician: No ref. provider found     Chief Complaint:   Chief Complaint   Patient presents with    Right Ankle - Pain     Saw Dr. Anthony       History of Present Illness: Nargis Weir is a 43 y.o. female who is here today with right heel pain.   has been going on since June 2022.  Saw Dr. Anthony starting in May 2023.   has done physical therapy as well as home exercises and also cam boot.   boot helped with her symptoms however they returned after she stopped wearing it.   has been working with therapy and seeing some improvement however has plateaued.   pain worse with activity.   does start limping after being on her feet for longer periods of time.    Subjective   Review of Systems: Review of Systems   Constitutional: Negative.  Negative for chills, fatigue and fever.   HENT: Negative.  Negative for congestion and dental problem.    Eyes: Negative.  Negative for blurred vision.   Respiratory: Negative.  Negative for shortness of breath.    Cardiovascular: Negative.  Negative for leg swelling.   Gastrointestinal: Negative.  Negative for abdominal pain.   Endocrine: Negative.  Negative for polyuria.   Genitourinary: Negative.  Negative for difficulty urinating.   Musculoskeletal:  Positive for arthralgias.   Skin: Negative.    Allergic/Immunologic: Negative.    Neurological: Negative.    Hematological: Negative.  Negative for adenopathy.   Psychiatric/Behavioral: Negative.  Negative for behavioral problems.       Past Medical History:   Past Medical History:   Diagnosis Date    Hip arthrosis 2016       Past Surgical History: No past surgical history on file.    Family History:   Family History   Problem Relation Age of Onset    Cancer Mother         breast cancer     "Osteoporosis Paternal Grandmother        Social History:   Social History     Socioeconomic History    Marital status:    Tobacco Use    Smoking status: Former     Packs/day: 0.25     Years: 15.00     Pack years: 3.75     Types: Cigarettes     Start date: 1994     Quit date: 2022     Years since quittin.4    Smokeless tobacco: Never    Tobacco comments:     Quit for a period of several years on one occasion and for a year on another occasion   Vaping Use    Vaping Use: Never used   Substance and Sexual Activity    Alcohol use: Yes     Alcohol/week: 2.0 standard drinks     Types: 2 Cans of beer per week     Comment: I drink socially once every few months or so    Drug use: Never    Sexual activity: Yes     Partners: Male     Birth control/protection: Vasectomy     Comment:  had a vasectomy in        Medications: No current outpatient medications on file.    Allergies: No Known Allergies    I reviewed the patient's chief complaint, history of present illness, review of systems, past medical history, surgical history, family history, social history, medications and allergy list.     Objective    Vital Signs:   Vitals:    10/04/23 0921   Weight: 122 kg (270 lb)   Height: 171 cm (67.32\")     Body mass index is 41.88 kg/m².    Ortho Exam:  right LE Foot and Ankle Exam:   Normal gait pattern. Hindfoot alignment is neutral. Plantigrade foot.   There is good perfusion to the toes.   The skin is intact throughout the foot and ankle without ulceration.   Range of motion of ankle, subtalar joint, midfoot and toes is within normal limits.   There is tenderness to palpation over the Achilles insertion site posterior heel, palpable hypertrophic prominence at site of pain.  Positive Silfverskiold test.  Patient also some tenderness palpation posterior medial heel at plantar fascia insertion site.    Results Review:   10/04/23 I have personally reviewed and interpreted the images from outside facility " with the documented findings, right foot x-rays from May 30 reviewed, enthesophyte visible Achilles insertion site as well as plantar fascia insertion site, no acute osseous abnormality      Assessment / Plan    Assessment/Plan:   Diagnoses and all orders for this visit:    1. Insertional Achilles tendinopathy (Primary)    2. Plantar fasciitis      Discussed heel pain/injury (a new problem with an uncertain prognosis) with patient as well as treatment options at length. Decision regarding surgical intervention considered. Patient is not a candidate due to trial of nonoperative management.  We will treat heel pain/injury as insertional Achilles tendonitis/retrocalcaneal bursitis which is generally thought to be an overuse syndrome or due to chronic aging change. The problem causing the pain is the chronic tendinitis, inflammation, wear and tear of the tendon where it goes into the bone. I explained it is very common especially in overweight people and the risk increases with age.  Patient also with some symptoms of Planter fasciitis however pain at Achilles insertion is more symptomatic.    Literature shows it is best treated with a 12 week course of physical therapy and night splinting. I have shown the patient the stretches to do at home (in addition to what PT shows them), and recommend they do them 10 reps, 5-6 times per day.  Patient will continue home therapy and patient was provided with a night splint in clinic today. Follow-up in 8 weeks for reevaluation.     Follow Up:   Return in about 8 weeks (around 11/29/2023).      Fernie Hoyos MD  [facility] [place] [facility]

## 2023-11-29 ENCOUNTER — TELEPHONE (OUTPATIENT)
Dept: ORTHOPEDIC SURGERY | Facility: CLINIC | Age: 43
End: 2023-11-29

## 2023-11-29 NOTE — TELEPHONE ENCOUNTER
Caller: Nargis Weir    Relationship to patient: Self    Best call back number: [phone]    Chief complaint: RIGHT HEEL     Type of visit: 8 WEEK FOLLOW UP     Requested date: 12/06/2023 PER KANNAN      If rescheduling, when is the original appointment: 11/29/2023     Additional notes:ATTEMPTED TO WARM TRANSFER FIRST AVAILABLE WAS 12/12/2023

## 2023-12-06 ENCOUNTER — OFFICE VISIT (OUTPATIENT)
Dept: ORTHOPEDIC SURGERY | Facility: CLINIC | Age: 43
End: 2023-12-06
Payer: COMMERCIAL

## 2023-12-06 VITALS
WEIGHT: 274.4 LBS | BODY MASS INDEX: 43.07 KG/M2 | DIASTOLIC BLOOD PRESSURE: 88 MMHG | HEIGHT: 67 IN | SYSTOLIC BLOOD PRESSURE: 144 MMHG

## 2023-12-06 DIAGNOSIS — M76.60 INSERTIONAL ACHILLES TENDINOPATHY: Primary | ICD-10-CM

## 2023-12-06 RX ORDER — ERGOCALCIFEROL 1.25 MG/1
CAPSULE ORAL
COMMUNITY
Start: 2023-11-24

## 2023-12-06 NOTE — PATIENT INSTRUCTIONS
"Knee Scooters:      [employer]  -[address], [address], [place] [address]  -[address]  -Phone: [phone]  ~$75.00/month rental    [employer].com - type in \"knee scooter\"  -Can purchase online for $100-150           "

## 2023-12-06 NOTE — PROGRESS NOTES
"                          [facility] [place] Orthopaedic Surgery Office Follow Up     Office Follow Up Visit     Date: 12/06/2023   Patient Name: Nargis Weir  MRN: [id number]  YOB: 1980  Chief Complaint:   Chief Complaint   Patient presents with    Follow-up     2 month follow up -- Insertional Achilles tendinopathy, plantar fascitis        History of Present Illness:   Nargis Weir is a 43 y.o. female who is here today for follow up for for her right heel pain.  Last seen in clinic October 4.  Has not tried physical therapy cam boot and home exercises.  Stopped wearing night splint because it caused pain from pressure on the Achilles insertion site.  States symptoms are unchanged.    Subjective   I reviewed the patient's chief complaint, history of present illness, review of systems, past medical history, surgical history, family history, social history, medications and allergy list   Objective    Vital Signs:   Vitals:    12/06/23 1000   BP: 144/88   Weight: 124 kg (274 lb 6.4 oz)   Height: 171 cm (67.32\")     Body mass index is 42.57 kg/m².    Ortho Exam:  right LE Foot and Ankle Exam:   Normal gait pattern. Hindfoot alignment is neutral. Plantigrade foot.   There is good perfusion to the toes.   The skin is intact throughout the foot and ankle without ulceration.   Range of motion of ankle, subtalar joint, midfoot and toes is within normal limits.   There is tenderness to palpation over the Achilles insertion site posterior heel, palpable hypertrophic prominence at site of pain.  Positive Silfverskiold test.  Patient also some tenderness palpation posterior medial heel at plantar fascia insertion site.    Results Review:  No new imaging    Assessment / Plan    Assessment/Plan:   Diagnoses and all orders for this visit:    1. Insertional Achilles tendinopathy (Primary)      Patient symptoms have persisted despite home exercises, activity modification and wearing boot.  Patient placed in short leg fiberglass " cast in clinic today and made nonweightbearing with the use of assistive devices.  Will plan to see patient back in 6 weeks for cast removal and reevaluation.  It was a pleasure seeing her today.    Follow Up:   Return in about 6 weeks (around 1/17/2024) for Recheck.      Fernie Hoyos MD  [facility] [place] [facility] Surgeon

## 2024-01-17 ENCOUNTER — OFFICE VISIT (OUTPATIENT)
Dept: ORTHOPEDIC SURGERY | Facility: CLINIC | Age: 44
End: 2024-01-17
Payer: COMMERCIAL

## 2024-01-17 VITALS
DIASTOLIC BLOOD PRESSURE: 84 MMHG | BODY MASS INDEX: 42.28 KG/M2 | HEIGHT: 67 IN | SYSTOLIC BLOOD PRESSURE: 132 MMHG | WEIGHT: 269.4 LBS

## 2024-01-17 DIAGNOSIS — M72.2 PLANTAR FASCIITIS: ICD-10-CM

## 2024-01-17 DIAGNOSIS — M76.60 INSERTIONAL ACHILLES TENDINOPATHY: Primary | ICD-10-CM

## 2024-01-17 NOTE — PROGRESS NOTES
"                          [facility] [place] [place] Orthopaedic Surgery Office Follow Up     Office Follow Up Visit     Date: 01/17/2024   Patient Name: Nargis Weir  MRN: [id number]  YOB: 1980  Chief Complaint:   Chief Complaint   Patient presents with    Follow-up     6 week follow up -- Insertional Achilles tendinopathy     History of Present Illness:   Nargis Weir is a 44 y.o. female who is here today for follow up for follow-up for her right heel pain.  Has been nonweightbearing in a short leg cast since last seen in clinic on December 6.  Cast removed in clinic today.  States having some tightness and soreness in her calf however pain directly over her posterior heel is much improved.  Still having some pain at the posterior and medial heel at the plantar fascia insertion site.  Does state that she was mostly nonweightbearing in her cast since last visit however did do some weightbearing on her cast especially with stairs at home.    Subjective   I reviewed the patient's chief complaint, history of present illness, review of systems, past medical history, surgical history, family history, social history, medications and allergy list   Objective    Vital Signs:   Vitals:    01/17/24 0925   BP: 132/84   Weight: 122 kg (269 lb 6.4 oz)   Height: 171 cm (67.32\")     Body mass index is 41.79 kg/m².    Ortho Exam:  right LE Foot and Ankle Exam:   Hindfoot alignment is neutral. Plantigrade foot.   There is good perfusion to the toes.   The skin is intact throughout the foot and ankle without ulceration.   Range of motion of ankle, subtalar joint, midfoot and toes is within normal limits.   There is no tenderness to palpation over the Achilles insertion site posterior heel. Positive Silfverskiold test.  Patient still has some tenderness palpation posterior medial heel at plantar fascia insertion site.    Results Review:  No new imaging    Assessment / Plan    Assessment/Plan:   Diagnoses and all orders for this " visit:    1. Insertional Achilles tendinopathy (Primary)  -     Ambulatory Referral to Physical Therapy    2. Plantar fasciitis  -     Ambulatory Referral to Physical Therapy      Patient's insertional Achilles pain much improved since last visit.  Still seems to be having some soreness in the calf which  I told her is not abnormal after being in a cast for 6 weeks.  Patient does still have some pain at the plantar fascia insertion site.  Believe some of this might be due to her activity level while wearing the cast and not adhering to strict nonweightbearing instructions.  That being said her pain is much improved at her Achilles insertion site which is encouraging.  Provided patient with referral to physical therapy focusing on stretching and eccentric strengthening of the calf and Achilles in addition to the plantar fascia.  Will plan to see patient back in 2 months for reevaluation.  It was a pleasure seeing her today.    Follow Up:   Return in about 2 months (around 3/17/2024) for Recheck.      Fernie Hoyos MD  [facility] [place] [facility]geon

## 2024-01-24 ENCOUNTER — TREATMENT (OUTPATIENT)
Dept: PHYSICAL THERAPY | Facility: CLINIC | Age: 44
End: 2024-01-24
Payer: COMMERCIAL

## 2024-01-24 DIAGNOSIS — M76.61 ACHILLES TENDINITIS OF RIGHT LOWER EXTREMITY: Primary | ICD-10-CM

## 2024-01-24 DIAGNOSIS — M79.671 RIGHT FOOT PAIN: ICD-10-CM

## 2024-01-24 PROCEDURE — 97110 THERAPEUTIC EXERCISES: CPT | Performed by: PHYSICAL THERAPIST

## 2024-01-24 PROCEDURE — 97162 PT EVAL MOD COMPLEX 30 MIN: CPT | Performed by: PHYSICAL THERAPIST

## 2024-01-24 NOTE — PROGRESS NOTES
Physical Therapy Initial Evaluation and Plan of Care      Patient: Nargis Weir   : 1980  Diagnosis/ICD-10 Code:  Achilles tendinitis of right lower extremity [M76.61]  Referring practitioner: Fernie Hoyos MD    Subjective Evaluation    History of Present Illness  Mechanism of injury: History of persistent achilles tendonitis. Went to an orthopedic surgeon for ankle/heel pain. Tried night splint with home stretches for about 8 weeks. Could not handle the night splint.   Was casted for 6 weeks which came off last Wednesday. Feels like she is more pain now than when she started.     Having a lot of tenderness in her soleus and into medial heel. Lateral ankle is also painful since cast has been off.       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       Palpation     Right   Hypertonic in the medial gastrocnemius and soleus. Tenderness of the anterior tibialis, medial gastrocnemius, posterior tibialis and soleus.     Tenderness     Right Ankle/Foot   Tenderness in the anterior talofibular ligament, lateral malleolus, medial calcaneus, medial malleolus, peroneal tendon and superior tibiofibular ligament.     Active Range of Motion   Left Ankle/Foot   Dorsiflexion (ke): 5 degrees   Plantar flexion: 35 degrees   Inversion: 40 degrees   Eversion: 20 degrees     Right Ankle/Foot   Dorsiflexion (ke): 0 degrees   Plantar flexion: 32 degrees   Inversion: 35 degrees   Eversion: 20 degrees     Joint Play     Right Ankle/Foot  Joints within functional limits are the forefoot. Hypomobile in the subtalar joint and midfoot.     Strength/Myotome Testing     Right Ankle/Foot   Dorsiflexion: 4  Plantar flexion: 4  Inversion: 4  Eversion: 4    Swelling   Left Ankle/Foot   Figure 8: 58 cm  Malleoli: 28 cm    Right Ankle/Foot    Figure 8: 60 cm  Malleoli: 28 cm    Ambulation   Weight-Bearing Status   Weight-Bearing Status (Right): weight-bearing as tolerated      Comments   Right ankle early heel rise           Assessment & Plan       Assessment  Impairments: abnormal coordination, abnormal gait, abnormal muscle firing, abnormal muscle tone, abnormal or restricted ROM, activity intolerance, impaired balance, impaired physical strength, lacks appropriate home exercise program, pain with function and weight-bearing intolerance   Functional limitations: lifting, walking, uncomfortable because of pain and standing   Assessment details: Patient is a 44 year old female presenting with right achilles tendonitis which has been persistent. She is dealing with pain with walking and standing and loss of function as well as loss of motion following cast and boot. She is appropriate for physical therapy to address this issue.   Prognosis: good  Prognosis details: Short Term Goals (3 weeks):  1. Patient will be independent with home exercise program.  2. Patient will demonstrate improved ankle mobility by 50%.  3. Patient will demonstrate improved ankle strength by 50%    Long Term Goals (8 weeks):  1. Patient will be able to walk at least 20 minutes with ankle pain no greater than 2/10.  2. Patient will demonstrate single leg balance for at least 20 seconds with ankle pain no greater than 2/10.  3. Patient will be able to return to full work/home duty with ankle pain no greater than 2/10.      Plan  Therapy options: will be seen for skilled therapy services  Planned modality interventions: ultrasound, TENS, thermotherapy (hydrocollator packs), high voltage pulsed current (spasm management), high voltage pulsed current (pain management), cryotherapy and dry needling  Planned therapy interventions: therapeutic activities, stretching, strengthening, spinal/joint mobilization, soft tissue mobilization, postural training, neuromuscular re-education, motor  coordination training, manual therapy, abdominal trunk stabilization, ADL retraining, joint mobilization, IADL retraining, home exercise program, gait training, flexibility, functional ROM exercises, fine motor coordination training, body mechanics training and balance/weight-bearing training  Frequency: 2x week  Duration in weeks: 8  Treatment plan discussed with: patient        Access Code: [email or web address]  URL: [email or web address]  Date: 01/24/2024  Prepared by: Rena Duran    Exercises  - Supine Bridge  - 1 x daily - 7 x weekly - 2 sets - 10 reps  - Clamshell  - 1 x daily - 7 x weekly - 2 sets - 10 reps  - Sidelying Reverse Clamshell  - 1 x daily - 7 x weekly - 2 sets - 10 reps  - Supine Active Straight Leg Raise  - 1 x daily - 7 x weekly - 2 sets - 10 reps  - Mini Squat with Counter Support  - 1 x daily - 7 x weekly - 1-2 sets - 10 reps  - Toe Raise With Back Against Wall  - 1 x daily - 7 x weekly - 2 sets - 10 reps      Manual Therapy:         mins  62572;  Therapeutic Exercise:    35     mins  20006;     Neuromuscular Gregorio:        mins  76946;    Therapeutic Activity:          mins  58909;     Gait Training:           mins  35782;     Ultrasound:          mins  81558;    Electrical Stimulation:        mins  59895 ( );  Dry Needling          mins self-pay    Timed Treatment:   35   mins   Total Treatment:     55   mins    PT SIGNATURE: Rena Duran, PT   DATE TREATMENT INITIATED: 1/26/2024    Initial Certification  Certification Period: 4/25/2024  I certify that the therapy services are furnished while this patient is under my care.  The services outlined above are required by this patient, and will be reviewed every 90 days.     PHYSICIAN: Fernie Hoyos MD      DATE:     Please sign and return via fax to [phone].. Thank you, [facility] Physical Therapy.

## 2024-01-30 ENCOUNTER — TREATMENT (OUTPATIENT)
Dept: PHYSICAL THERAPY | Facility: CLINIC | Age: 44
End: 2024-01-30
Payer: COMMERCIAL

## 2024-01-30 DIAGNOSIS — M79.671 RIGHT FOOT PAIN: ICD-10-CM

## 2024-01-30 DIAGNOSIS — M76.61 ACHILLES TENDINITIS OF RIGHT LOWER EXTREMITY: Primary | ICD-10-CM

## 2024-01-30 PROCEDURE — 97116 GAIT TRAINING THERAPY: CPT | Performed by: PHYSICAL THERAPIST

## 2024-01-30 PROCEDURE — 97110 THERAPEUTIC EXERCISES: CPT | Performed by: PHYSICAL THERAPIST

## 2024-01-30 NOTE — PROGRESS NOTES
Physical Therapy Daily Treatment Note         [address]              [place] [address]    Patient: Nargis Weir   : 1980  Diagnosis/ICD-10 Code:  Achilles tendinitis of right lower extremity [M76.61]  Referring practitioner: Fernie Hoyos MD  Date of Initial Visit: Type: THERAPY  Noted: 2024  Today's Date: 2024  Patient seen for 2 sessions         Nargis Weir reports: some soreness following initial visit.       Objective     See Exercise, Manual, and Modality Logs for complete treatment.       Assessment/Plan  Will continue open and closed chain exercise as well as gait training.   Progress per Plan of Care           Manual Therapy:         mins  81304;  Therapeutic Exercise:    43     mins  13104;     Neuromuscular Gregorio:        mins  97237;    Therapeutic Activity:          mins  88649;     Gait Training:      10     mins  93394;     Ultrasound:          mins  35387;    Electrical Stimulation:         mins  50910 ( );  Dry Needling          mins self-pay    Timed Treatment:   53   mins   Total Treatment:     63   mins    Rena Duran PT  Physical Therapist

## 2024-02-01 ENCOUNTER — TREATMENT (OUTPATIENT)
Dept: PHYSICAL THERAPY | Facility: CLINIC | Age: 44
End: 2024-02-01
Payer: COMMERCIAL

## 2024-02-01 DIAGNOSIS — M76.61 ACHILLES TENDINITIS OF RIGHT LOWER EXTREMITY: Primary | ICD-10-CM

## 2024-02-01 PROCEDURE — 97110 THERAPEUTIC EXERCISES: CPT | Performed by: PHYSICAL THERAPIST

## 2024-02-01 PROCEDURE — 97116 GAIT TRAINING THERAPY: CPT | Performed by: PHYSICAL THERAPIST

## 2024-02-01 NOTE — PROGRESS NOTES
Physical Therapy Daily Treatment Note         [address]              [place] [address]    Patient: Nargis Weir   : 1980  Diagnosis/ICD-10 Code:  Achilles tendinitis of right lower extremity [M76.61]  Referring practitioner: Fernie Hoyos MD  Date of Initial Visit: Type: THERAPY  Noted: 2024  Today's Date: 2024  Patient seen for 3 sessions         Nargis Weir reports: soreness on back of heel and with stepping.        Objective   See Exercise, Manual, and Modality Logs for complete treatment.       Assessment/Plan  Tolerating exercise well. Will progress as tolerated.   Progress per Plan of Care           Manual Therapy:         mins  81299;  Therapeutic Exercise:    43     mins  15770;     Neuromuscular Gregorio:        mins  48125;    Therapeutic Activity:          mins  06152;     Gait Training:       10    mins  18024;     Ultrasound:          mins  95894;    Electrical Stimulation:         mins  02082 ( );  Dry Needling          mins self-pay    Timed Treatment:   53   mins   Total Treatment:     63   mins    Rena Duran PT  Physical Therapist

## 2024-02-08 ENCOUNTER — TREATMENT (OUTPATIENT)
Dept: PHYSICAL THERAPY | Facility: CLINIC | Age: 44
End: 2024-02-08
Payer: COMMERCIAL

## 2024-02-08 DIAGNOSIS — M76.61 ACHILLES TENDINITIS OF RIGHT LOWER EXTREMITY: Primary | ICD-10-CM

## 2024-02-08 DIAGNOSIS — M79.671 RIGHT FOOT PAIN: ICD-10-CM

## 2024-02-08 PROCEDURE — 97110 THERAPEUTIC EXERCISES: CPT | Performed by: PHYSICAL THERAPIST

## 2024-02-08 PROCEDURE — 97530 THERAPEUTIC ACTIVITIES: CPT | Performed by: PHYSICAL THERAPIST

## 2024-02-08 NOTE — PROGRESS NOTES
Physical Therapy Daily Treatment Note         [address]              [place] [address]    Patient: Nargis Weir   : 1980  Diagnosis/ICD-10 Code:  Achilles tendinitis of right lower extremity [M76.61]  Referring practitioner: Fernie Hoyos MD  Date of Initial Visit: Type: THERAPY  Noted: 2024  Today's Date: 2024  Patient seen for 4 sessions         Nargis Weir reports: ankle was feeling a lot better yesterday but sore today on either side of calcaneous. Not as tender to touch on post or bottom of heel.         Objective   See Exercise, Manual, and Modality Logs for complete treatment.       Assessment/Plan  Gently progressing strength and ankle stability.   Progress per Plan of Care           Manual Therapy:         mins  14276;  Therapeutic Exercise:    40     mins  31367;     Neuromuscular Gregorio:        mins  39902;    Therapeutic Activity:     15     mins  02677;     Gait Training:           mins  63397;     Ultrasound:          mins  00446;    Electrical Stimulation:         mins  88009 ( );  Dry Needling          mins self-pay    Timed Treatment:   55   mins   Total Treatment:     55   mins    Rena Duran PT  Physical Therapist

## 2024-02-13 ENCOUNTER — TREATMENT (OUTPATIENT)
Dept: PHYSICAL THERAPY | Facility: CLINIC | Age: 44
End: 2024-02-13
Payer: COMMERCIAL

## 2024-02-13 DIAGNOSIS — M79.671 RIGHT FOOT PAIN: ICD-10-CM

## 2024-02-13 DIAGNOSIS — M76.61 ACHILLES TENDINITIS OF RIGHT LOWER EXTREMITY: Primary | ICD-10-CM

## 2024-02-15 ENCOUNTER — TREATMENT (OUTPATIENT)
Dept: PHYSICAL THERAPY | Facility: CLINIC | Age: 44
End: 2024-02-15
Payer: COMMERCIAL

## 2024-02-15 DIAGNOSIS — M76.61 ACHILLES TENDINITIS OF RIGHT LOWER EXTREMITY: Primary | ICD-10-CM

## 2024-02-15 DIAGNOSIS — M79.671 RIGHT FOOT PAIN: ICD-10-CM

## 2024-02-21 NOTE — PROGRESS NOTES
Physical Therapy Daily Treatment Note         [address]              [place] [address]    Patient: Nargis Weir   : 1980  Diagnosis/ICD-10 Code:  Achilles tendinitis of right lower extremity [M76.61]  Referring practitioner: Fernie Hoyos MD  Date of Initial Visit: Type: THERAPY  Noted: 2024  Today's Date: 2024  Patient seen for 6 sessions         Nargis Weir reports: soreness has minimized to just a few spots.         Objective   See Exercise, Manual, and Modality Logs for complete treatment.       Assessment/Plan  Does have of muscle tension in calf which we worked on manually.   Progress per Plan of Care           Manual Therapy:    10     mins  02176;  Therapeutic Exercise:    40     mins  96979;     Neuromuscular Gregorio:        mins  13023;    Therapeutic Activity:     15     mins  53590;     Gait Training:           mins  27897;     Ultrasound:          mins  63985;    Electrical Stimulation:         mins  20792 ( );  Dry Needling          mins self-pay    Timed Treatment:   65   mins   Total Treatment:     65   mins    Rena Duran PT  Physical Therapist

## 2024-02-29 ENCOUNTER — TREATMENT (OUTPATIENT)
Dept: PHYSICAL THERAPY | Facility: CLINIC | Age: 44
End: 2024-02-29
Payer: COMMERCIAL

## 2024-02-29 DIAGNOSIS — M79.671 RIGHT FOOT PAIN: ICD-10-CM

## 2024-02-29 DIAGNOSIS — M76.61 ACHILLES TENDINITIS OF RIGHT LOWER EXTREMITY: Primary | ICD-10-CM

## 2024-02-29 PROCEDURE — 97140 MANUAL THERAPY 1/> REGIONS: CPT | Performed by: PHYSICAL THERAPIST

## 2024-02-29 PROCEDURE — 97110 THERAPEUTIC EXERCISES: CPT | Performed by: PHYSICAL THERAPIST

## 2024-02-29 NOTE — PROGRESS NOTES
"   Physical Therapy Daily Treatment Note         [address]              [place] [address]    Patient: aNrgis Weir   : 1980  Diagnosis/ICD-10 Code:  Achilles tendinitis of right lower extremity [M76.61]  Referring practitioner: Fernie Hoyos MD  Date of Initial Visit: Type: THERAPY  Noted: 2024  Today's Date: 2024  Patient seen for 7 sessions         Nargis Weir reports: not having pain at bottom of heel anymore but a little more at achilles tendon itself. Overall pain is less and function is much better but does still have a constant pain and does not feel \"normal\" compared to left foot.     Objective   See Exercise, Manual, and Modality Logs for complete treatment.       Assessment/Plan  Will progressively overload tendon. Did have tightness and some trigger points along side achilles tendon.   Progress per Plan of Care           Manual Therapy:    13     mins  44832;  Therapeutic Exercise:    43     mins  82549;     Neuromuscular Gregorio:        mins  50575;    Therapeutic Activity:          mins  58024;     Gait Training:           mins  12486;     Ultrasound:          mins  03102;    Electrical Stimulation:         mins  37088 ( );  Dry Needling          mins self-pay    Timed Treatment:   56   mins   Total Treatment:     56   mins    Rena Duran PT  Physical Therapist                      "

## 2024-03-05 ENCOUNTER — TREATMENT (OUTPATIENT)
Dept: PHYSICAL THERAPY | Facility: CLINIC | Age: 44
End: 2024-03-05
Payer: COMMERCIAL

## 2024-03-05 DIAGNOSIS — M79.671 RIGHT FOOT PAIN: ICD-10-CM

## 2024-03-05 DIAGNOSIS — M76.61 ACHILLES TENDINITIS OF RIGHT LOWER EXTREMITY: Primary | ICD-10-CM

## 2024-03-05 PROCEDURE — 97140 MANUAL THERAPY 1/> REGIONS: CPT | Performed by: PHYSICAL THERAPIST

## 2024-03-05 PROCEDURE — 97110 THERAPEUTIC EXERCISES: CPT | Performed by: PHYSICAL THERAPIST

## 2024-03-05 NOTE — PROGRESS NOTES
Physical Therapy Daily Treatment Note         [address]              [place] [address]    Patient: Nargis Weir   : 1980  Diagnosis/ICD-10 Code:  Achilles tendinitis of right lower extremity [M76.61]  Referring practitioner: Fernie Hoyos MD  Date of Initial Visit: Type: THERAPY  Noted: 2024  Today's Date: 3/5/2024  Patient seen for 8 sessions         Nargis Weir reports: still having soreness in back of heel (med to achilles tendon). Legs were sore after last visit.         Objective   See Exercise, Manual, and Modality Logs for complete treatment.       Assessment/Plan  Able to progress squat with good tolerance.   Progress per Plan of Care           Manual Therapy:    13     mins  46741;  Therapeutic Exercise:    45     mins  33493;     Neuromuscular Gregorio:        mins  49366;    Therapeutic Activity:          mins  51939;     Gait Training:          mins  98258;     Ultrasound:          mins  37561;    Electrical Stimulation:         mins  89201 ( );  Dry Needling          mins self-pay    Timed Treatment:   58   mins   Total Treatment:     58   mins    Rena Duran PT  Physical Therapist

## 2024-03-08 ENCOUNTER — TREATMENT (OUTPATIENT)
Dept: PHYSICAL THERAPY | Facility: CLINIC | Age: 44
End: 2024-03-08
Payer: COMMERCIAL

## 2024-03-08 DIAGNOSIS — M79.671 RIGHT FOOT PAIN: ICD-10-CM

## 2024-03-08 DIAGNOSIS — M76.61 ACHILLES TENDINITIS OF RIGHT LOWER EXTREMITY: Primary | ICD-10-CM

## 2024-03-08 PROCEDURE — 97140 MANUAL THERAPY 1/> REGIONS: CPT | Performed by: PHYSICAL THERAPIST

## 2024-03-08 PROCEDURE — 97110 THERAPEUTIC EXERCISES: CPT | Performed by: PHYSICAL THERAPIST

## 2024-03-14 ENCOUNTER — TREATMENT (OUTPATIENT)
Dept: PHYSICAL THERAPY | Facility: CLINIC | Age: 44
End: 2024-03-14
Payer: COMMERCIAL

## 2024-03-14 DIAGNOSIS — M79.671 RIGHT FOOT PAIN: ICD-10-CM

## 2024-03-14 DIAGNOSIS — M76.61 ACHILLES TENDINITIS OF RIGHT LOWER EXTREMITY: Primary | ICD-10-CM

## 2024-03-14 PROCEDURE — 97140 MANUAL THERAPY 1/> REGIONS: CPT | Performed by: PHYSICAL THERAPIST

## 2024-03-14 PROCEDURE — 97110 THERAPEUTIC EXERCISES: CPT | Performed by: PHYSICAL THERAPIST

## 2024-03-14 NOTE — PROGRESS NOTES
Nargis Weir 1980   Diagnosis/ Surgery: ***              Date Of Injury: ***    Date Of Surgery:***    Hand Dominance: ***  History of Present Condition: ***  Medical/Vocational History/ Medications: ***    Pain: ***    Edema: ***  Sensibility: WNL   Wound Status:***  ROM/ Strength: ***      Goals:  Patient is independent with HEP.   Plan:  Patient HEP includes ***.   No additional treatment is required for this patient at this time. The patient is therefore discharged from therapy.  Patient advised to contact therapist with any additional questions or concerns regarding the HEP and/or instructions given.           PT SIGNATURE: Rena Duran, PT   DATE TREATMENT INITIATED: 3/14/2024    {Certification Type:[id number]}  Certification Period: 6/12/2024  I certify that the therapy services are furnished while this patient is under my care.  The services outlined above are required by this patient, and will be reviewed every 90 days.     PHYSICIAN: Fernie Hoyos MD      DATE:     Please sign and return via fax to [phone].. Thank you, [facility] Physical Therapy.

## 2024-03-14 NOTE — PROGRESS NOTES
Physical Therapy Daily Treatment Note         [address]              [place] [address]    Patient: Nargis Weir   : 1980  Diagnosis/ICD-10 Code:  Achilles tendinitis of right lower extremity [M76.61]  Referring practitioner: Fernie Hoyos MD  Date of Initial Visit: Type: THERAPY  Noted: 2024  Today's Date: 3/14/2024  Patient seen for 9 sessions         Nargis Weir reports: more pain and soreness over past few days.         Objective   See Exercise, Manual, and Modality Logs for complete treatment.       Assessment/Plan  Still working on mobility and closed chain LE strengthening.   Progress per Plan of Care           Manual Therapy:    13     mins  18916;  Therapeutic Exercise:    45     mins  96909;     Neuromuscular Gregorio:        mins  05311;    Therapeutic Activity:          mins  12305;     Gait Training:           mins  35555;     Ultrasound:          mins  85849;    Electrical Stimulation:         mins  33607 ( );  Dry Needling         mins self-pay    Timed Treatment:   58   mins   Total Treatment:     58   mins    Rena Duran PT  Physical Therapist

## 2024-03-18 ENCOUNTER — OFFICE VISIT (OUTPATIENT)
Dept: ORTHOPEDIC SURGERY | Facility: CLINIC | Age: 44
End: 2024-03-18
Payer: COMMERCIAL

## 2024-03-18 VITALS
HEIGHT: 67 IN | DIASTOLIC BLOOD PRESSURE: 80 MMHG | WEIGHT: 264.6 LBS | SYSTOLIC BLOOD PRESSURE: 118 MMHG | BODY MASS INDEX: 41.53 KG/M2

## 2024-03-18 DIAGNOSIS — M77.51 RETROCALCANEAL BURSITIS (BACK OF HEEL), RIGHT: ICD-10-CM

## 2024-03-18 DIAGNOSIS — M76.60 INSERTIONAL ACHILLES TENDINOPATHY: Primary | ICD-10-CM

## 2024-03-18 DIAGNOSIS — M72.2 PLANTAR FASCIITIS: ICD-10-CM

## 2024-03-18 PROCEDURE — 99213 OFFICE O/P EST LOW 20 MIN: CPT | Performed by: ORTHOPAEDIC SURGERY

## 2024-03-18 NOTE — PROGRESS NOTES
"                          [facility] [place] [facility] Office Follow Up     Office Follow Up Visit     Date: 03/18/2024   Patient Name: Nargis Weir  MRN: [id number]  YOB: 1980  Chief Complaint:   Chief Complaint   Patient presents with    Follow-up     2 month follow up -- Insertional Achilles tendinopathy, plantar fasciitis        History of Present Illness:   Nargis Weir is a 44 y.o. female who is here today for follow up for right plantar fasciitis, insertional Achilles tendinitis, midsubstance Achilles tendinitis.  Here today in normal shoe.  Continues to see physical therapy regularly and is making some improvement.  States has noticed improvement in range of motion.  Her pain at her Achilles insertion site has improved.  Still having some pain at the plantar fascia insertion site and Achilles midsubstance however although overall feels like she is making progress.    Subjective   I reviewed the patient's chief complaint, history of present illness, review of systems, past medical history, surgical history, family history, social history, medications and allergy list   Objective    Vital Signs:   Vitals:    03/18/24 0930   BP: 118/80   Weight: 120 kg (264 lb 9.6 oz)   Height: 171 cm (67.32\")     Body mass index is 41.05 kg/m².    Ortho Exam:  right LE Foot and Ankle Exam:   Hindfoot alignment is neutral. Plantigrade foot.   There is good perfusion to the toes.   The skin is intact throughout the foot and ankle without ulceration.   Range of motion of ankle, subtalar joint, midfoot and toes is within normal limits.   There is minimal tenderness to palpation over the Achilles insertion site posterior heel.  Patient still has some tenderness palpation posterior medial heel at plantar fascia insertion site.  Also some tenderness in the Achilles midsubstance.    Results Review:  No new imaging    Assessment / Plan    Assessment/Plan:   Diagnoses and all orders for this visit:    1. Insertional " Achilles tendinopathy (Primary)  -     Ambulatory Referral to Physical Therapy Evaluate and treat    2. Plantar fasciitis  -     Ambulatory Referral to Physical Therapy Evaluate and treat    3. Retrocalcaneal bursitis (back of heel), right  -     Ambulatory Referral to Physical Therapy Evaluate and treat      Returns once again for her symptoms of insertional Achilles tendinitis, midsubstance tendinitis as well as Planter fasciitis.  Patient continues to work diligently with therapy and is slowly making improvement.  I provided patient with another prescription for physical therapy in clinic today.  Also discussed with patient wearing a night splint.  She has discontinued because it was previously uncomfortable but thinks she can tolerate it now.  Counseled patient to start wearing that again on a nightly basis.  Patient to continue these conservative treat modalities will plan to see her back in 2 months for reevaluation.  Was a pleasure seeing her today.    Follow Up:   Return in about 2 months (around 5/18/2024).      Fernie Hoyos MD  [facility] [place] [facility]

## 2024-03-19 NOTE — PROGRESS NOTES
Physical Therapy Daily Treatment Note         [address]              [place] [address]    Patient: Nargis Weir   : 1980  Diagnosis/ICD-10 Code:  Achilles tendinitis of right lower extremity [M76.61]  Referring practitioner: Fernie Hoyos MD  Date of Initial Visit: Type: THERAPY  Noted: 2024  Today's Date: 3/19/2024  Patient seen for 10 sessions         Nargis Weir reports: no new complaints         Objective   See Exercise, Manual, and Modality Logs for complete treatment.       Assessment/Plan  Will make progress note next visit and analyze gait.   Progress per Plan of Care           Manual Therapy:    13     mins  55253;  Therapeutic Exercise:    45     mins  01585;     Neuromuscular Gregorio:        mins  72022;    Therapeutic Activity:          mins  41544;     Gait Training:           mins  61876;     Ultrasound:         mins  37110;    Electrical Stimulation:         mins  54175 ( );  Dry Needling          mins self-pay    Timed Treatment:   58   mins   Total Treatment:     58   mins    Rena Duran PT  Physical Therapist

## 2024-03-20 ENCOUNTER — TREATMENT (OUTPATIENT)
Dept: PHYSICAL THERAPY | Facility: CLINIC | Age: 44
End: 2024-03-20
Payer: COMMERCIAL

## 2024-03-20 DIAGNOSIS — M79.671 RIGHT FOOT PAIN: ICD-10-CM

## 2024-03-20 DIAGNOSIS — M76.61 ACHILLES TENDINITIS OF RIGHT LOWER EXTREMITY: Primary | ICD-10-CM

## 2024-03-20 PROCEDURE — 97140 MANUAL THERAPY 1/> REGIONS: CPT | Performed by: PHYSICAL THERAPIST

## 2024-03-20 PROCEDURE — 97110 THERAPEUTIC EXERCISES: CPT | Performed by: PHYSICAL THERAPIST

## 2024-03-20 NOTE — PROGRESS NOTES
Physical Therapy Progress Note         [address] [facility] [address]              [place] [address]    Patient: Nargis Weir   : 1980  Diagnosis/ICD-10 Code:  Achilles tendinitis of right lower extremity [M76.61]  Referring practitioner: Fernie Hoyos MD  Date of Initial Visit: Type: THERAPY  Noted: 2024  Today's Date: 3/20/2024  Patient seen for 11 sessions         Nargis Weir reports: has made progress but still has lingering pain with walking and first steps after sitting for a while    Objective          Active Range of Motion     Right Ankle/Foot   Dorsiflexion (ke): 5 degrees   Plantar flexion: 35 degrees   Inversion: 35 degrees   Eversion: 20 degrees     Ambulation     Comments   Patient demonstrates lack of rearfoot valgus in standing and throughout stance phase bilaterally. Bilateral external rotation (can see 3 toes). Loss of dorsiflexion on right with mildly early heel rise. Patient stays in supination through whole gait cycle and 1st ray is overly plantarflexed to compensate.       See Exercise, Manual, and Modality Logs for complete treatment.       Assessment/Plan  Discussed neutral shoes and possible an orthotic. May order one for clinic and test it out. Will continue tendon progressive overload but without going into negatives.     Progress toward previous goals: Partially Met    Short Term Goals (3 weeks):  1. Patient will be independent with home exercise program.  MET   2. Patient will demonstrate improved ankle mobility by 50%. MET   3. Patient will demonstrate improved ankle strength by 50% MET      Long Term Goals (8 weeks):  1. Patient will be able to walk at least 20 minutes with ankle pain no greater than 2/10.  Progressing   2. Patient will demonstrate single leg balance for at least 20 seconds with ankle pain no greater than 2/10.  MET   3. Patient will be able to return to full work/home duty with ankle pain no greater than 2/10.    Progressing     Progress per Plan of  Care  1-2x/week for 4 weeks.          Manual Therapy:    13     mins  24403;  Therapeutic Exercise:    35     mins  96992;     Neuromuscular Gregorio:        mins  16285;    Therapeutic Activity:          mins  59813;     Gait Training:           mins  43891;     Ultrasound:         mins  24783;    Electrical Stimulation:         mins  12255 ( );  Dry Needling          mins self-pay    Timed Treatment:   48   mins   Total Treatment:     48   mins    Rena Duran PT  Physical Therapist

## 2024-03-22 ENCOUNTER — TREATMENT (OUTPATIENT)
Dept: PHYSICAL THERAPY | Facility: CLINIC | Age: 44
End: 2024-03-22
Payer: COMMERCIAL

## 2024-03-22 DIAGNOSIS — M76.61 ACHILLES TENDINITIS OF RIGHT LOWER EXTREMITY: Primary | ICD-10-CM

## 2024-03-22 DIAGNOSIS — M79.671 RIGHT FOOT PAIN: ICD-10-CM

## 2024-03-22 NOTE — PROGRESS NOTES
Physical Therapy Daily Treatment Note         [address]              [place] [address]    Patient: Nargis Weir   : 1980  Diagnosis/ICD-10 Code:  Achilles tendinitis of right lower extremity [M76.61]  Referring practitioner: Fernie Hoyos MD  Date of Initial Visit: Type: THERAPY  Noted: 2024  Today's Date: 3/22/2024  Patient seen for 12 sessions         Nargis Weir reports: tape was very helpful, particularly when moving ankle around unloaded.         Objective   See Exercise, Manual, and Modality Logs for complete treatment.       Assessment/Plan  Will continue taping, looking at orthotics and will order a sample pair to try out.   Progress per Plan of Care           Manual Therapy:    13     mins  98310;  Therapeutic Exercise:    35     mins  84585;     Neuromuscular Gregorio:        mins  92692;    Therapeutic Activity:     10     mins  34648;     Gait Training:           mins  55318;     Ultrasound:          mins  10096;    Electrical Stimulation:         mins  67290 ( );  Dry Needling          mins self-pay    Timed Treatment:   58   mins   Total Treatment:     58   mins    Rena Duran PT  Physical Therapist

## 2024-03-28 ENCOUNTER — TREATMENT (OUTPATIENT)
Dept: PHYSICAL THERAPY | Facility: CLINIC | Age: 44
End: 2024-03-28
Payer: COMMERCIAL

## 2024-03-28 DIAGNOSIS — M76.61 ACHILLES TENDINITIS OF RIGHT LOWER EXTREMITY: Primary | ICD-10-CM

## 2024-03-28 DIAGNOSIS — M79.671 RIGHT FOOT PAIN: ICD-10-CM

## 2024-04-05 NOTE — PROGRESS NOTES
Physical Therapy Daily Treatment Note         [address] [facility] [address]              [place] [address]    Patient: Nargis Weir   : 1980  Diagnosis/ICD-10 Code:  Achilles tendinitis of right lower extremity [M76.61]  Referring practitioner: Fernie Hoyos MD  Date of Initial Visit: Type: THERAPY  Noted: 2024  Today's Date: 2024  Patient seen for 13 sessions         Nargis Weir reports: has been tolerating more walking.         Objective   See Exercise, Manual, and Modality Logs for complete treatment.       Assessment/Plan  Will continue to progress tendon loading. Has a camping trip coming up.   Progress per Plan of Care           Manual Therapy:    13     mins  69092;  Therapeutic Exercise:    35     mins  85475;     Neuromuscular Gregorio:        mins  30023;    Therapeutic Activity:     10     mins  88959;     Gait Training:           mins  94716;     Ultrasound:          mins  46332;    Electrical Stimulation:         mins  96059 ( );  Dry Needling          mins self-pay    Timed Treatment:   58   mins   Total Treatment:     58   mins    Rena Duran PT  Physical Therapist

## 2024-04-09 ENCOUNTER — TREATMENT (OUTPATIENT)
Dept: PHYSICAL THERAPY | Facility: CLINIC | Age: 44
End: 2024-04-09
Payer: COMMERCIAL

## 2024-04-09 DIAGNOSIS — M76.61 ACHILLES TENDINITIS OF RIGHT LOWER EXTREMITY: Primary | ICD-10-CM

## 2024-04-09 DIAGNOSIS — M79.671 RIGHT FOOT PAIN: ICD-10-CM

## 2024-04-09 PROCEDURE — 97530 THERAPEUTIC ACTIVITIES: CPT | Performed by: PHYSICAL THERAPIST

## 2024-04-09 PROCEDURE — 97140 MANUAL THERAPY 1/> REGIONS: CPT | Performed by: PHYSICAL THERAPIST

## 2024-04-09 PROCEDURE — 97110 THERAPEUTIC EXERCISES: CPT | Performed by: PHYSICAL THERAPIST

## 2024-04-16 ENCOUNTER — TREATMENT (OUTPATIENT)
Dept: PHYSICAL THERAPY | Facility: CLINIC | Age: 44
End: 2024-04-16
Payer: COMMERCIAL

## 2024-04-16 DIAGNOSIS — M79.671 RIGHT FOOT PAIN: ICD-10-CM

## 2024-04-16 DIAGNOSIS — M76.61 ACHILLES TENDINITIS OF RIGHT LOWER EXTREMITY: Primary | ICD-10-CM

## 2024-04-16 NOTE — PROGRESS NOTES
Physical Therapy Daily Treatment Note         [address]              [place] [address]    Patient: Nargis Weir   : 1980  Diagnosis/ICD-10 Code:  Achilles tendinitis of right lower extremity [M76.61]  Referring practitioner: Fernie Hoyos MD  Date of Initial Visit: Type: THERAPY  Noted: 2024  Today's Date: 2024  Patient seen for 14 sessions         Nargis Weir reports: no new complaints, went on camping trip and walking was better but did go into and out of camper a lot and the stairs while holdign things got ankle a little aggravated.         Objective   See Exercise, Manual, and Modality Logs for complete treatment.       Assessment/Plan  Slow progression, she is going to order discussed orthotics.   Progress per Plan of Care           Manual Therapy:    13     mins  40601;  Therapeutic Exercise:    35     mins  96045;     Neuromuscular Gregorio:        mins  63608;    Therapeutic Activity:    10     mins  28234;     Gait Training:           mins  55921;     Ultrasound:          mins  33596;    Electrical Stimulation:        mins  84967 ( );  Dry Needling          mins self-pay    Timed Treatment:   58   mins   Total Treatment:     58   mins    Rnea Duran, JENNY  Physical Therapist

## 2024-04-18 ENCOUNTER — TREATMENT (OUTPATIENT)
Dept: PHYSICAL THERAPY | Facility: CLINIC | Age: 44
End: 2024-04-18
Payer: COMMERCIAL

## 2024-04-18 DIAGNOSIS — M76.61 ACHILLES TENDINITIS OF RIGHT LOWER EXTREMITY: Primary | ICD-10-CM

## 2024-04-18 DIAGNOSIS — M79.671 RIGHT FOOT PAIN: ICD-10-CM

## 2024-04-18 PROCEDURE — 97530 THERAPEUTIC ACTIVITIES: CPT | Performed by: PHYSICAL THERAPIST

## 2024-04-18 PROCEDURE — 97110 THERAPEUTIC EXERCISES: CPT | Performed by: PHYSICAL THERAPIST

## 2024-04-18 NOTE — PROGRESS NOTES
Physical Therapy Daily Treatment Note         [address] [facility] [address]              [place] [address]    Patient: Nargis Weir   : 1980  Diagnosis/ICD-10 Code:  Achilles tendinitis of right lower extremity [M76.61]  Referring practitioner: Fernie Hoyos MD  Date of Initial Visit: Type: THERAPY  Noted: 2024  Today's Date: 2024  Patient seen for 16 sessions         Nargis Weir reports: so far no issues with new orthotics but have not been walking much due to work.         Objective   See Exercise, Manual, and Modality Logs for complete treatment.       Assessment/Plan  Focused today on strength and tendon loading   Progress per Plan of Care           Manual Therapy:         mins  30500;  Therapeutic Exercise:    43     mins  44518;     Neuromuscular Gregorio:        mins  25581;    Therapeutic Activity:     12     mins  16565;     Gait Training:           mins  46265;     Ultrasound:          mins  40545;    Electrical Stimulation:         mins  61646 (MC );  Dry Needling         mins self-pay    Timed Treatment:   55   mins   Total Treatment:     55   mins    Rena Duran PT  Physical Therapist

## 2024-04-19 NOTE — PROGRESS NOTES
Physical Therapy Daily Treatment Note         [address] [facility] [address]              [place] [address]    Patient: Nargis Weir   : 1980  Diagnosis/ICD-10 Code:  Achilles tendinitis of right lower extremity [M76.61]  Referring practitioner: Fernie Hoyos MD  Date of Initial Visit: Type: THERAPY  Noted: 2024  Today's Date: 2024  Patient seen for 15 sessions         Nargis Weir reports: received orthotics.         Objective   See Exercise, Manual, and Modality Logs for complete treatment.       Assessment/Plan  Educated on breaking in schedule for orthotics, patient verbalized understanding.   Progress per Plan of Care           Manual Therapy:    13     mins  67932;  Therapeutic Exercise:    35     mins  83295;     Neuromuscular Gregorio:        mins  52754;    Therapeutic Activity:     10     mins  54213;     Gait Training:           mins  99363;     Ultrasound:          mins  57580;    Electrical Stimulation:         mins  13628 ( );  Dry Needling          mins self-pay    Timed Treatment:   58   mins   Total Treatment:     58   mins    Rena Duran PT  Physical Therapist

## 2024-04-23 ENCOUNTER — TELEPHONE (OUTPATIENT)
Dept: PHYSICAL THERAPY | Facility: CLINIC | Age: 44
End: 2024-04-23

## 2024-04-23 NOTE — TELEPHONE ENCOUNTER
Caller: Nargis Weir    Relationship: Self    What was the call regarding: PATIENT WILL NOT BE AT TODAY'S APPT DUE TO WORK SCHEDULE CHANGE.

## 2024-05-07 ENCOUNTER — TREATMENT (OUTPATIENT)
Dept: PHYSICAL THERAPY | Facility: CLINIC | Age: 44
End: 2024-05-07
Payer: COMMERCIAL

## 2024-05-07 DIAGNOSIS — M76.61 ACHILLES TENDINITIS OF RIGHT LOWER EXTREMITY: Primary | ICD-10-CM

## 2024-05-07 DIAGNOSIS — M79.671 RIGHT FOOT PAIN: ICD-10-CM

## 2024-05-07 PROCEDURE — 97530 THERAPEUTIC ACTIVITIES: CPT | Performed by: PHYSICAL THERAPIST

## 2024-05-07 PROCEDURE — 97110 THERAPEUTIC EXERCISES: CPT | Performed by: PHYSICAL THERAPIST

## 2024-05-20 ENCOUNTER — OFFICE VISIT (OUTPATIENT)
Dept: ORTHOPEDIC SURGERY | Facility: CLINIC | Age: 44
End: 2024-05-20
Payer: COMMERCIAL

## 2024-05-20 VITALS
BODY MASS INDEX: 41.65 KG/M2 | SYSTOLIC BLOOD PRESSURE: 154 MMHG | DIASTOLIC BLOOD PRESSURE: 88 MMHG | HEIGHT: 67 IN | WEIGHT: 265.4 LBS

## 2024-05-20 DIAGNOSIS — M76.60 INSERTIONAL ACHILLES TENDINOPATHY: Primary | ICD-10-CM

## 2024-05-20 PROCEDURE — 99213 OFFICE O/P EST LOW 20 MIN: CPT | Performed by: ORTHOPAEDIC SURGERY

## 2024-05-20 NOTE — PROGRESS NOTES
"                          [facility] Office Follow Up     Office Follow Up Visit     Date: 05/20/2024   Patient Name: Nargis Weir  MRN: [id number]  YOB: 1980  Chief Complaint:   Chief Complaint   Patient presents with    Follow-up     2 month follow up -- Insertional Achilles tendinopathy, plantar fasciitis      History of Present Illness:   Nargis Weir is a 44 y.o. female who is here today for follow up for follow-up for right plantar fasciitis, insertional Achilles tendinitis, midsubstance Achilles tendinitis.  Last seen in clinic approximately 2 months ago.  Has been continues to do physical therapy and reports that continues to make improvement with physical therapy.  Having said she states she still is having pain.  The worst of her pain is still at the Achilles insertion site.  She is less painful in the mid substance as well as at the plantar fascia insertion site.  Overall she reports she is making progress and is much better than she was when she first saw me last October.  She was that she was getting better faster but does states she is able to do more activities as she was previously since her symptoms have improved.    Subjective   I reviewed the patient's chief complaint, history of present illness, review of systems, past medical history, surgical history, family history, social history, medications and allergy list   Objective    Vital Signs:   Vitals:    05/20/24 0903   BP: 154/88   Weight: 120 kg (265 lb 6.4 oz)   Height: 171 cm (67.32\")     Body mass index is 41.17 kg/m².    Ortho Exam:  right LE Foot and Ankle Exam:   Hindfoot alignment is neutral. Plantigrade foot.   There is good perfusion to the toes.   The skin is intact throughout the foot and ankle without ulceration.   Range of motion of ankle, subtalar joint, midfoot and toes is within normal limits.   There is some tenderness to palpation over the Achilles insertion site posterior heel.  Patient still " has some tenderness palpation posterior medial heel at plantar fascia insertion site.  No tenderness mid substance of Achilles today    Results Review:  No new imaging    Assessment / Plan    Assessment/Plan:   Diagnoses and all orders for this visit:    1. Insertional Achilles tendinopathy (Primary)      Returns to clinic for follow-up of her insertional Achilles tendinitis, midsubstance Achilles tendinitis as well as Planter fasciitis.  Has continued to make progress with therapy and her symptoms continue to improve.  States she is not 100% and still has a ways to go but sees that with therapy symptoms are improving and is noticing she can continue to do more and more activities with less pain.  Overall she is happy with her progress and plans to continue to do therapy although she thinks she may switch to start doing it at home on her own which she feels comfortable doing.  Will plan to continue to wear the night splint.  We discussed follow-up and patient would like to come back in 6 months.  Counseled patient to come back sooner if any concerns arise.    Follow Up:   Return in about 6 months (around 11/20/2024).      Fernie Hoyos MD  [facility] Surgeon